# Patient Record
Sex: FEMALE | Race: WHITE
[De-identification: names, ages, dates, MRNs, and addresses within clinical notes are randomized per-mention and may not be internally consistent; named-entity substitution may affect disease eponyms.]

---

## 2017-02-28 ENCOUNTER — HOSPITAL ENCOUNTER (OUTPATIENT)
Dept: HOSPITAL 75 - RAD | Age: 52
End: 2017-02-28
Attending: OBSTETRICS & GYNECOLOGY
Payer: COMMERCIAL

## 2017-02-28 DIAGNOSIS — Z12.31: Primary | ICD-10-CM

## 2017-02-28 PROCEDURE — 77067 SCR MAMMO BI INCL CAD: CPT

## 2017-02-28 NOTE — XMS REPORT
Continuity of Care Document

 Created on: 2017



LUCIE AGUIAR

External Reference #: [id number]

: 1965

Sex: Female



Demographics







 Address  [address] [place]  [address]

 

 Home Phone  [phone]

 

 Preferred Language  Unknown

 

 Marital Status  Unknown

 

 Yazdanism Affiliation  Unknown

 

 Race  Unknown

 

 Ethnic Group  Unknown





Author







 Author  [facility]

 

 Organization  [facility]

 

 Address  Unknown

 

 Phone  Unavailable



                                                      



Allergies

                      





 Active                    Description                    Code                 
   Type                    Severity                    Reaction                
    Onset                    Reported/Identified                    
Relationship to Patient                    Clinical Status                

 

 Yes                    ENVIRONMENTAL                    ENVIRONMENTAL         
                                Mild                    N/A                    
                     2009                                                
          



                                                                               
         



Medications

                                                                               
         



Problems

                      





 Date Dx Coded                    Attending                    Type            
        Code                    Diagnosis                    Diagnosed By      
          

 

 2011                                         Ot                    
574.10                    CHOLELITH W CHOLECYS NEC                             
        

 

 2011                                         Ot                    575.6
                    GB CHOLESTEROLOSIS                                     

 

 2014                                         Ot                    621.2
                                                          

 

 2014                                         Ot                    625.8
                                                          

 

 2014                                         Ot                    787.3
                                                          

 

 2014                                         Ot                    620.2
                                                          

 

 2014                                         Ot                    621.2
                                                          

 

 2014                                         Ot                    620.2
                                                          

 

 2014                                         Ot                    793.5
                                                          

 

 2014                                         Ot                    621.0
                                                          

 

 2014                                         Ot                    626.8
                                                          

 

 2014                                         Ot                    793.5
                                                          

 

 2014                                         Ot                    
V72.83                                                          

 

 2014                                         Ot                    V74.8
                                                          

 

 2014                                         Ot                    
789.00                                                          

 

 2014                                         Ot                    
611.72                                                          

 

 2014                                         Ot                    
V76.12                                                          

 

 2014                                         Ot                    
V76.12                                                          

 

 2014                                         Ot                    575.6
                                                          

 

 2014                                         Ot                    
V72.63                                                          

 

 2014                                         Ot                    
V72.81                                                          

 

 2014                                         Ot                    V74.8
                                                          

 

 2014                                         Ot                    
793.82                                                          

 

 2014                                         Ot                    
V76.12                                                          

 

 2014                                         Ot                    
793.82                                                          

 

 2014                                         Ot                    
793.89                                                          

 

 2014                                         Ot                    
793.89                                                          

 

 2014                    ADRIANNA MUNOZ DO                    Ot         
           793.80                                                          

 

 2014                                         Ot                    621.2
                                                          

 

 2014                                         Ot                    625.8
                                                          

 

 2014                                         Ot                    787.3
                                                          

 

 2014                                         Ot                    620.2
                                                          

 

 2014                                         Ot                    621.2
                                                          

 

 2014                                         Ot                    620.2
                                                          

 

 2014                                         Ot                    793.5
                                                          

 

 2014                                         Ot                    621.0
                                                          

 

 2014                                         Ot                    626.8
                                                          

 

 2014                                         Ot                    793.5
                                                          

 

 2014                                         Ot                    
V72.83                                                          

 

 2014                                         Ot                    V74.8
                                                          

 

 2014                                         Ot                    
789.00                                                          

 

 2014                                         Ot                    
611.72                                                          

 

 2014                                         Ot                    
V76.12                                                          

 

 2014                                         Ot                    
V76.12                                                          

 

 2014                                         Ot                    575.6
                                                          

 

 2014                                         Ot                    
V72.63                                                          

 

 2014                                         Ot                    
V72.81                                                          

 

 2014                                         Ot                    V74.8
                                                          

 

 2014                                         Ot                    
793.82                                                          

 

 2014                                         Ot                    
V76.12                                                          

 

 2014                                         Ot                    
793.82                                                          

 

 2014                                         Ot                    
793.89                                                          

 

 2014                                         Ot                    
793.89                                                          

 

 2014                    ADRIANNA MUNOZ DO                    Ot         
           793.80                                                          

 

 12/10/2014                    ADRIANNA MUNOZ DO                    Ot         
           793.89                                                          

 

 2015                    ADRIANNA MUNOZ DO                    Ot         
           793.89                                                          

 

 2015                                         Ot                    
611.72                                                          

 

 2015                                         Ot                    
V76.12                                                          

 

 2015                                         Ot                    
V76.12                                                          

 

 2015                                         Ot                    575.6
                                                          

 

 2015                                         Ot                    
V72.63                                                          

 

 2015                                         Ot                    
V72.81                                                          

 

 2015                                         Ot                    V74.8
                                                          

 

 2015                                         Ot                    
793.82                                                          

 

 2015                                         Ot                    
V76.12                                                          

 

 2015                                         Ot                    
793.82                                                          

 

 2015                                         Ot                    
793.89                                                          

 

 2015                                         Ot                    
793.89                                                          

 

 2015                    ADRIANNA MUNOZ DO                    Ot         
           793.80                                                          

 

 2015                    ADRIANNA MUNOZ DO                    Ot         
           793.89                                                          

 

 11/10/2015                                         Ot                    
611.72                                                          

 

 11/10/2015                                         Ot                    
V76.12                                                          

 

 11/10/2015                                         Ot                    
V76.12                                                          

 

 11/10/2015                                         Ot                    575.6
                                                          

 

 11/10/2015                                         Ot                    
V72.63                                                          

 

 11/10/2015                                         Ot                    
V72.81                                                          

 

 11/10/2015                                         Ot                    V74.8
                                                          

 

 11/10/2015                                         Ot                    
793.82                                                          

 

 11/10/2015                                         Ot                    
V76.12                                                          

 

 11/10/2015                                         Ot                    
793.82                                                          

 

 11/10/2015                                         Ot                    
793.89                                                          

 

 11/10/2015                                         Ot                    
793.89                                                          

 

 11/10/2015                    ADRIANNA MUNOZ DO JUS                    Ot         
           793.80                                                          

 

 11/10/2015                    ALEXANDER DOADRIANNA JUS                    Ot         
           793.89                                                          

 

 2016                                         Ot                    
611.72                                                          

 

 2016                                         Ot                    
V76.12                                                          

 

 2016                                         Ot                    
V76.12                                                          

 

 2016                                         Ot                    575.6
                                                          

 

 2016                                         Ot                    
V72.63                                                          

 

 2016                                         Ot                    
V72.81                                                          

 

 2016                                         Ot                    V74.8
                                                          

 

 2016                                         Ot                    
793.82                                                          

 

 2016                                         Ot                    
V76.12                                                          

 

 2016                                         Ot                    
793.82                                                          

 

 2016                                         Ot                    
793.89                                                          

 

 2016                                         Ot                    
793.89                                                          

 

 2016                    ALEXANDER LEE ADRIANNA C                    Ot         
           793.80                                                          

 

 2016                    ALEXANDER LEE ADRIANNA C                    Ot         
           793.89                                                          

 

 2016                    ALXEANDER LEE ADRIANNA C                    Ot         
           Z12.31                                                          

 

 02/10/2016                    ALEXANDER DO ADRIANNA C                    Ot         
           Z12.31                                                          

 

 2016                                         Ot                    
V76.12                                                          

 

 2016                                         Ot                    575.6
                                                          

 

 2016                                         Ot                    
V72.63                                                          

 

 2016                                         Ot                    
V72.81                                                          

 

 2016                                         Ot                    V74.8
                                                          

 

 2016                                         Ot                    
793.82                                                          

 

 2016                                         Ot                    
V76.12                                                          

 

 2016                                         Ot                    
793.82                                                          

 

 2016                                         Ot                    
793.89                                                          

 

 2016                                         Ot                    
793.89                                                          

 

 2016                    ALEXANDER LEE ADRIANNA C                    Ot         
           793.80                                                          

 

 2016                    ALEXANDER LEE ADRIANNA C                    Ot         
           793.89                                                          

 

 2016                    ALEXANDER DO ADRIANNA C                    Ot         
           Z12.31                                                          

 

 2016                                         Ot                    
V76.12                                                          

 

 2016                                         Ot                    575.6
                                                          

 

 2016                                         Ot                    
V72.63                                                          

 

 2016                                         Ot                    
V72.81                                                          

 

 2016                                         Ot                    V74.8
                                                          

 

 2016                                         Ot                    
793.82                                                          

 

 2016                                         Ot                    
V76.12                                                          

 

 2016                                         Ot                    
793.82                                                          

 

 2016                                         Ot                    
793.89                                                          

 

 2016                                         Ot                    
793.89                                                          

 

 2016                    MUNOZ DO ADRIANNA C                    Ot         
           793.80                                                          

 

 2016                    MUNOZ DO ADRIANNA LUU                    Ot         
           793.89                                                          

 

 2016                                         Ot                    
V76.12                    OTH SCREEN MAMMO-MALIGN NEOPLASM OF YOUSIF             
                        

 

 2016                                         Ot                    575.6
                    GB CHOLESTEROLOSIS                                     

 

 2016                                         Ot                    
V72.63                    PRE-PROCEDURAL LABORATORY EXAMINATION                
                     

 

 2016                                         Ot                    
V72.81                    EXAM-PRE-OPERATIVE CARDIOVASCULAR                    
                 

 

 2016                                         Ot                    V74.8
                    SCREEN-BACTERIAL DIS NEC                                   
  

 

 2016                                         Ot                    
793.82                    INCONCLUSIVE MAMMOGRAM                               
      

 

 2016                                         Ot                    
V76.12                    OTH SCREEN MAMMO-MALIGN NEOPLASM OF YOUSIF             
                        

 

 2016                                         Ot                    
793.82                    INCONCLUSIVE MAMMOGRAM                               
      

 

 2016                                         Ot                    
793.89                    OTH (ABN) FINDINGS ON RADIOLOGICAL EXAMI             
                        

 

 2016                                         Ot                    
793.89                    OTH (ABN) FINDINGS ON RADIOLOGICAL EXAMI             
                        

 

 2016                    ADRIANNA MUNOZ DO                    Ot         
           793.80                    UNSPEC ABNORMAL MAMMOGRAM                 
                    

 

 2016                    ADRIANNA MUNOZ DO                    Ot         
           793.89                    OTH (ABN) FINDINGS ON RADIOLOGICAL EXAMI  
                                   

 

 2016                                         Ot                    
V76.12                    OTH SCREEN MAMMO-MALIGN NEOPLASM OF YOUSIF             
                        

 

 2016                                         Ot                    575.6
                    GB CHOLESTEROLOSIS                                     

 

 2016                                         Ot                    
V72.63                    PRE-PROCEDURAL LABORATORY EXAMINATION                
                     

 

 2016                                         Ot                    
V72.81                    EXAM-PRE-OPERATIVE CARDIOVASCULAR                    
                 

 

 2016                                         Ot                    V74.8
                    SCREEN-BACTERIAL DIS NEC                                   
  

 

 2016                                         Ot                    
793.82                    INCONCLUSIVE MAMMOGRAM                               
      

 

 2016                                         Ot                    
V76.12                    OTH SCREEN MAMMO-MALIGN NEOPLASM OF YOUSIF             
                        

 

 2016                                         Ot                    
793.82                    INCONCLUSIVE MAMMOGRAM                               
      

 

 2016                                         Ot                    
793.89                    OTH (ABN) FINDINGS ON RADIOLOGICAL EXAMI             
                        

 

 2016                                         Ot                    
793.89                    OTH (ABN) FINDINGS ON RADIOLOGICAL EXAMI             
                        

 

 2016                    ADRIANNA MUNOZ DO                    Ot         
           793.80                    UNSPEC ABNORMAL MAMMOGRAM                 
                    

 

 2016                    ADRIANNA MUNOZ DO                    Ot         
           793.89                    OTH (ABN) FINDINGS ON RADIOLOGICAL EXAMI  
                                   

 

 2016                                         Ot                    
V76.12                    OTH SCREEN MAMMO-MALIGN NEOPLASM OF YOUSIF             
                        

 

 2016                                         Ot                    575.6
                    GB CHOLESTEROLOSIS                                     

 

 2016                                         Ot                    
V72.63                    PRE-PROCEDURAL LABORATORY EXAMINATION                
                     

 

 2016                                         Ot                    
V72.81                    EXAM-PRE-OPERATIVE CARDIOVASCULAR                    
                 

 

 2016                                         Ot                    V74.8
                    SCREEN-BACTERIAL DIS NEC                                   
  

 

 2016                                         Ot                    
793.82                    INCONCLUSIVE MAMMOGRAM                               
      

 

 2016                                         Ot                    
V76.12                    OTH SCREEN MAMMO-MALIGN NEOPLASM OF YOUSIF             
                        

 

 2016                                         Ot                    
793.82                    INCONCLUSIVE MAMMOGRAM                               
      

 

 2016                                         Ot                    
793.89                    OTH (ABN) FINDINGS ON RADIOLOGICAL EXAMI             
                        

 

 2016                                         Ot                    
793.89                    OTH (ABN) FINDINGS ON RADIOLOGICAL EXAMI             
                        

 

 2016                    ADRIANNA MUNOZ DO                    Ot         
           793.80                    UNSPEC ABNORMAL MAMMOGRAM                 
                    

 

 2016                    ADRIANNA MUNOZ DO                    Ot         
           793.89                    OTH (ABN) FINDINGS ON RADIOLOGICAL EXAMI  
                                   

 

 2016                                         Ot                    
V76.12                    OTH SCREEN MAMMO-MALIGN NEOPLASM OF YOUSIF             
                        

 

 2016                                         Ot                    575.6
                    GB CHOLESTEROLOSIS                                     

 

 2016                                         Ot                    
V72.63                    PRE-PROCEDURAL LABORATORY EXAMINATION                
                     

 

 2016                                         Ot                    
V72.81                    EXAM-PRE-OPERATIVE CARDIOVASCULAR                    
                 

 

 2016                                         Ot                    V74.8
                    SCREEN-BACTERIAL DIS NEC                                   
  

 

 2016                                         Ot                    
793.82                    INCONCLUSIVE MAMMOGRAM                               
      

 

 2016                                         Ot                    
V76.12                    OTH SCREEN MAMMO-MALIGN NEOPLASM OF YOUSIF             
                        

 

 2016                                         Ot                    
793.82                    INCONCLUSIVE MAMMOGRAM                               
      

 

 2016                                         Ot                    
793.89                    OTH (ABN) FINDINGS ON RADIOLOGICAL EXAMI             
                        

 

 2016                                         Ot                    
793.89                    OTH (ABN) FINDINGS ON RADIOLOGICAL EXAMI             
                        

 

 2016                    ADRIANNA MUNOZ DO                    Ot         
           793.80                    UNSPEC ABNORMAL MAMMOGRAM                 
                    

 

 2016                    ADRIANNA MUNOZ DO                    Ot         
           793.89                    OTH (ABN) FINDINGS ON RADIOLOGICAL EXAMI  
                                   

 

 06/15/2016                                         Ot                    
V76.12                    OTH SCREEN MAMMO-MALIGN NEOPLASM OF YOUSIF             
                        

 

 06/15/2016                                         Ot                    575.6
                    GB CHOLESTEROLOSIS                                     

 

 06/15/2016                                         Ot                    
V72.63                    PRE-PROCEDURAL LABORATORY EXAMINATION                
                     

 

 06/15/2016                                         Ot                    
V72.81                    EXAM-PRE-OPERATIVE CARDIOVASCULAR                    
                 

 

 06/15/2016                                         Ot                    V74.8
                    SCREEN-BACTERIAL DIS NEC                                   
  

 

 06/15/2016                                         Ot                    
793.82                    INCONCLUSIVE MAMMOGRAM                               
      

 

 06/15/2016                                         Ot                    
V76.12                    OTH SCREEN MAMMO-MALIGN NEOPLASM OF YOUSIF             
                        

 

 06/15/2016                                         Ot                    
793.82                    INCONCLUSIVE MAMMOGRAM                               
      

 

 06/15/2016                                         Ot                    
793.89                    OTH (ABN) FINDINGS ON RADIOLOGICAL EXAMI             
                        

 

 06/15/2016                                         Ot                    
793.89                    OTH (ABN) FINDINGS ON RADIOLOGICAL EXAMI             
                        

 

 06/15/2016                    ADRIANNA MUNOZ DO                    Ot         
           793.80                    UNSPEC ABNORMAL MAMMOGRAM                 
                    

 

 06/15/2016                    ADRIANNA MUNOZ DO                    Ot         
           793.89                    OTH (ABN) FINDINGS ON RADIOLOGICAL EXAMI  
                                   



                                                                               
                                                                               
                                                                               
                                                                               
                                                                               
                                                                               
                                                                               
                                                                               
                                                                               
                                                                               
                                                                               
                                                                               
                                                                               
                                                                               
                                                                               
                                                                               
                                                                               
                                                                               
                                                                               
                                                                               
                                                                               
                                                                               
                                                                               
                                                                               
                                



Procedures

                                                                               
         



Results

                                                                    



Encounters

                      





 ACCT No.                    Visit Date/Time                    Discharge      
              Status                    Pt. Type                    Provider   
                 Facility                    Loc./Unit                    
Complaint                

 

 [id number]                    2014 07:39:00                    2014 23:59:59                    [facility]                    Outpatient             
       ADRIANNA MUNOZ DO JUS                    [facility]   
                 RAD                                     

 

 [id number]                    10/29/2013 07:32:00                    10/29/
2013 23:59:59                    [facility]S                    Outpatient             
       ADRIANNA MUNOZ DO JUS                    [facility]   
                 RAD                                     

 

 [id number]                    2016 08:18:00                           
              ACT                    Outpatient                    ADRIANNA MUNOZ DO JUS                    [facility]                    
RAD                                     

 

 [id number]                    2015 12:04:00                           
                                   Document Registration                       
                                                                             

 

 [id number]                    2013 12:06:00                           
                                   Document Registration                       
                                                                             

 

 [id number]                    10/15/2012 07:35:00                           
                                   Document Registration                       
                                                                             

 

 [id number]                    10/10/2012 07:08:00                           
                                   Document Registration                       
                                                                             

 

 [id number]                    2011 05:35:00                           
                                   Document Registration                       
                                                                             

 

 [id number]                    2011 14:04:00                           
                                   Document Registration                       
                                                                             

 

 [id number]                    2011 08:27:00                           
                                   Document Registration                       
                                                                             

 

 [id number]                    2010 09:30:00                           
                                   Document Registration                       
                                                                             

 

 [id number]                    2010 08:14:00                           
                                   Document Registration                       
                                                                             

 

 [id number]                    10/23/2009 13:00:00                           
                                   Document Registration                       
                                                                             

 

 [id number]                    2009 11:06:00                           
                                   Document Registration                       
                                                                             

 

 [id number]                    2009 10:05:00                           
                                   Document Registration                       
                                                                             

 

 [id number]                    2009 10:01:00                           
                                   Document Registration

## 2017-03-01 NOTE — DIAGNOSTIC IMAGING REPORT
Bilateral screening mammogram.



The current study was also evaluated with a Computer Aided

Detection (CAD) system.



INDICATION: Screening. No current complaints stated on the

questionnaire.



COMPARISON: 01/12/2016.



FINDINGS: The breasts are composed of heterogeneously dense

parenchyma which may decrease mammographic sensitivity. No

developing mass, architectural distortion or suspicious cluster

of calcification. Allowing for technique and positional

differences, no suspicious change is seen.



IMPRESSION: Dense breasts with no definite change.



ACR BI-RADS Category 2: Benign findings.

Result letter will be mailed to the patient.

Note: At least 10% of breast cancer is not imaged by mammography.



Dictated by:



Dictated on workstation # [id number]

## 2017-11-22 ENCOUNTER — HOSPITAL ENCOUNTER (OUTPATIENT)
Dept: HOSPITAL 75 - CARD | Age: 52
End: 2017-11-22
Attending: INTERNAL MEDICINE
Payer: COMMERCIAL

## 2017-11-22 VITALS — HEIGHT: 67 IN | WEIGHT: 160 LBS | BODY MASS INDEX: 25.11 KG/M2

## 2017-11-22 VITALS — SYSTOLIC BLOOD PRESSURE: 129 MMHG | DIASTOLIC BLOOD PRESSURE: 96 MMHG

## 2017-11-22 DIAGNOSIS — R07.89: Primary | ICD-10-CM

## 2017-11-22 PROCEDURE — 93017 CV STRESS TEST TRACING ONLY: CPT

## 2017-11-22 PROCEDURE — 78452 HT MUSCLE IMAGE SPECT MULT: CPT

## 2017-11-22 NOTE — STRESS TEST
DATE OF SERVICE:  11/22/2017



NUCLEAR MYOVIEW REPORT



REFERRING PHYSICIAN:

Shey Salcido DO



SUMMARY:

The patient was injected with 10.25 mCi of technetium-99 Myoview and the resting

images were obtained.  With peak stress level, a 30.1 mCi of technetium-99

Myoview were injected and the stress images were acquired.



The resting and stress images were reviewed and compared in the short axis,

horizontal long axis and vertical long axis views.  Review of the images showed

motion artifact with breast attenuation, mild decreased uptake at the mid to

apical inferior wall and inferior lateral wall.  SSS is 6.  SDS is 6.  TID value

is 0.74.  On the gated images, the left ventricle appeared to be normal size

with normal contractility.  Calculated ejection fraction 76%.



CONCLUSION:

1.  Breast attenuation with motion artifact.

2.  Mild ischemia at the mid to apical inferior wall and inferoseptum.

3.  Normal left ventricular size with normal contractility.  Calculated ejection

fraction 76%.





Job ID: [id number]

DocumentID: [id number]

Dictated Date:  11/22/2017 14:20:23

Transcription Date: 11/22/2017 17:25:05

Dictated By: CHRISTIAN NAVARRO MD

## 2017-11-29 ENCOUNTER — HOSPITAL ENCOUNTER (OUTPATIENT)
Dept: HOSPITAL 75 - CATH | Age: 52
Discharge: HOME | End: 2017-11-29
Attending: INTERNAL MEDICINE
Payer: COMMERCIAL

## 2017-11-29 VITALS — DIASTOLIC BLOOD PRESSURE: 49 MMHG | SYSTOLIC BLOOD PRESSURE: 141 MMHG

## 2017-11-29 VITALS — SYSTOLIC BLOOD PRESSURE: 146 MMHG | DIASTOLIC BLOOD PRESSURE: 78 MMHG

## 2017-11-29 VITALS — DIASTOLIC BLOOD PRESSURE: 83 MMHG | SYSTOLIC BLOOD PRESSURE: 146 MMHG

## 2017-11-29 VITALS — DIASTOLIC BLOOD PRESSURE: 78 MMHG | SYSTOLIC BLOOD PRESSURE: 148 MMHG

## 2017-11-29 VITALS — DIASTOLIC BLOOD PRESSURE: 80 MMHG | SYSTOLIC BLOOD PRESSURE: 158 MMHG

## 2017-11-29 VITALS — SYSTOLIC BLOOD PRESSURE: 148 MMHG | DIASTOLIC BLOOD PRESSURE: 81 MMHG

## 2017-11-29 VITALS — BODY MASS INDEX: 25.11 KG/M2 | WEIGHT: 160 LBS | HEIGHT: 67 IN

## 2017-11-29 VITALS — DIASTOLIC BLOOD PRESSURE: 76 MMHG | SYSTOLIC BLOOD PRESSURE: 149 MMHG

## 2017-11-29 VITALS — SYSTOLIC BLOOD PRESSURE: 144 MMHG | DIASTOLIC BLOOD PRESSURE: 92 MMHG

## 2017-11-29 DIAGNOSIS — Z11.2: ICD-10-CM

## 2017-11-29 DIAGNOSIS — I25.10: Primary | ICD-10-CM

## 2017-11-29 LAB
ALBUMIN SERPL-MCNC: 4.1 GM/DL (ref 3.2–4.5)
ALT SERPL-CCNC: 30 U/L (ref 0–55)
ANION GAP SERPL CALC-SCNC: 8 MMOL/L (ref 5–14)
APTT BLD: 26 SEC (ref 24–35)
AST SERPL-CCNC: 23 U/L (ref 5–34)
BILIRUB SERPL-MCNC: 0.4 MG/DL (ref 0.1–1)
BILIRUB UR QL STRIP: NEGATIVE
BUN SERPL-MCNC: 18 MG/DL (ref 7–18)
BUN/CREAT SERPL: 25
CALCIUM SERPL-MCNC: 9.7 MG/DL (ref 8.5–10.1)
CHLORIDE SERPL-SCNC: 106 MMOL/L (ref 98–107)
CO2 SERPL-SCNC: 28 MMOL/L (ref 21–32)
CREAT SERPL-MCNC: 0.72 MG/DL (ref 0.6–1.3)
ERYTHROCYTE [DISTWIDTH] IN BLOOD BY AUTOMATED COUNT: 12.3 % (ref 10–14.5)
GFR SERPLBLD BASED ON 1.73 SQ M-ARVRAT: > 60 ML/MIN
GLUCOSE SERPL-MCNC: 86 MG/DL (ref 70–105)
INR PPP: 0.9 (ref 0.8–1.4)
KETONES UR QL STRIP: NEGATIVE
LEUKOCYTE ESTERASE UR QL STRIP: (no result)
MCH RBC QN AUTO: 30 PG (ref 25–34)
MCHC RBC AUTO-ENTMCNC: 35 G/DL (ref 32–36)
MCV RBC AUTO: 85 FL (ref 80–99)
NITRITE UR QL STRIP: NEGATIVE
PH UR STRIP: 7 [PH] (ref 5–9)
PLATELET # BLD: 262 10^3/UL (ref 130–400)
PMV BLD AUTO: 9.2 FL (ref 7.4–10.4)
POTASSIUM SERPL-SCNC: 3.6 MMOL/L (ref 3.6–5)
PROT SERPL-MCNC: 7.6 GM/DL (ref 6.4–8.2)
PROT UR QL STRIP: (no result)
PROTHROMBIN TIME: 12.2 SEC (ref 12.2–14.7)
RBC # BLD AUTO: 4.64 10^6/UL (ref 4.35–5.85)
SODIUM SERPL-SCNC: 142 MMOL/L (ref 135–145)
SP GR UR STRIP: 1.01 (ref 1.02–1.02)
SQUAMOUS #/AREA URNS HPF: >50 /HPF
UROBILINOGEN UR-MCNC: NORMAL MG/DL
WBC # BLD AUTO: 6.8 10^3/UL (ref 4.3–11)
WBC #/AREA URNS HPF: (no result) /HPF

## 2017-11-29 PROCEDURE — 93458 L HRT ARTERY/VENTRICLE ANGIO: CPT

## 2017-11-29 PROCEDURE — 85730 THROMBOPLASTIN TIME PARTIAL: CPT

## 2017-11-29 PROCEDURE — 85027 COMPLETE CBC AUTOMATED: CPT

## 2017-11-29 PROCEDURE — 93005 ELECTROCARDIOGRAM TRACING: CPT

## 2017-11-29 PROCEDURE — 87081 CULTURE SCREEN ONLY: CPT

## 2017-11-29 PROCEDURE — 71010: CPT

## 2017-11-29 PROCEDURE — 85610 PROTHROMBIN TIME: CPT

## 2017-11-29 PROCEDURE — 36415 COLL VENOUS BLD VENIPUNCTURE: CPT

## 2017-11-29 PROCEDURE — 80053 COMPREHEN METABOLIC PANEL: CPT

## 2017-11-29 PROCEDURE — 81000 URINALYSIS NONAUTO W/SCOPE: CPT

## 2017-11-29 NOTE — XMS REPORT
[place] [facility] Clinical Summary

 Created on: 2015



César Nay KATTY

External Reference #: [id number]

: 1965

Sex: Female



Demographics







 Address  [address] [place]  [address]

 

 Home Phone  [email or web address]

 

 Preferred Language  Unknown

 

 Marital Status  M

 

 Advent Affiliation  Unknown

 

 Race  White

 

 Ethnic Group  Not  or 





Author







 Author  User, NAE Puckett  [place] [facility]

 

 Address  Unknown

 

 Phone  [phone]







Allergies, Adverse Reactions, Alerts







 Allergy Name  Reaction Description  Start Date  Severity  Status  Provider

 

 No Known Allergies             Shey Salcido







Conditions or Problems







 Problem Name  Problem Code  Onset Date  Status  Entry Date  Provider  Comment  
Standard Description  Annotate

 

 RENAL CALCULUS  592.0    Active    Shey Salcido   
  Calculus of kidney   

 

 WEIGHT GAIN, ABNORMAL  783.1    Resolved    Shey Salcido     Abnormal weight gain   

 

 BACK PAIN  724.5    Resolved    Shey Salcido     
Backache, unspecified   

 

 MUSCLE PAIN  729.1    Resolved    Shey Salcido     
Myalgia and myositis, unspecified   

 

 AMENORRHEA  626.0    Resolved    Shey Salcido     
Absence of menstruation   

 

 NEUROPATHY, IDIOPATHIC PERIPHERAL  356.9    Resolved    
Shey Salcido     Unspecified idiopathic peripheral neuropathy   

 

 DIARRHEA, RECURRENT  787.91    Resolved    Shey Salcido     Diarrhea   

 

 MENOPAUSAL SYNDROME  627.0    Active    Shey Salcido     Premenopausal menorrhagia   

 

 MALABSORPTION SYNDROME  579.9    Resolved    Shey Salcido     Unspecified intestinal malabsorption   

 

 HYPOKALEMIA  276.8    Active    Shey Salcido     
Hypopotassemia   

 

 HYPOMAGNESEMIA  275.2    Active    Shey Salcido   
  Disorders of magnesium metabolism   

 

 PALPITATIONS, OCCASIONAL  785.1  2009/07/10  Resolved    Shey Salcido     Palpitations   

 

 ABDOMINAL PAIN, EPIGASTRIC  789.06    Resolved    Shey Salcido     Abdominal pain, epigastric   

 

 HYPERTENSION  401.1    Active    Shey Salcido     
Benign essential hypertension   

 

 PSORIASIS  696.1    Resolved    Shey Salcido     
Other psoriasis   

 

 ONYCHOMYCOSIS, TOENAILS  110.1    Resolved    Shey Salcido     Dermatophytosis of nail   

 

 MIGRAINE VARIANT  346.20    Active    Shey Salcido
     Variants of migraine, not elsewhere classified, without mention of 
intractable migraine, without mention of status migrainosus   

 

 ABNORMAL MAMMOGRAM  793.80  2012/10/15  Resolved    Shey Salcido     Abnormal mammogram, unspecified   

 

 BRONCHITIS  490    Resolved    Shey Salcido     
Bronchitis, not specified as acute or chronic   

 

 WHEEZING  786.07    Resolved    Shey Salcido     
Wheezing   

 

 SINUSITIS, SPHENOIDAL, ACUTE  461.3    Resolved    Shey Salcido     Acute sphenoidal sinusitis   

 

 PHARYNGITIS, ACUTE  462    Resolved    Shey Salcido     Acute pharyngitis   

 

 CHEST PAIN, ATYPICAL  786.59    Active    Shey Salcido     Other chest pain   

 

 COUGH  786.2    Active    Shey Salcido     Cough   

 

 ALLERGIC RHINITIS, SEASONAL  477.0    Active    Shey Salcido     Allergic rhinitis due to pollen   

 

 DYSPNEA  786.09    Active    Shey Salcido     
Other dyspnea and respiratory abnormality   







Medication List







 Medication  Instructions  Start Date  Stop Date  Generic Name  NDC  Status  
Provider  Patient Instruction

 

 MINOCIN 100 MG CAPS  1 PO BID    MINOCYCLINE HCL  
[id number]  Active  Shey Salcido   

 

 DIFLUCAN 100 MG TAB  1 PO daily for three days for yeast infection.    FLUCONAZOLE  [id number]  No Longer Active  Shey Salcido   

 

 PROAIR  (90 BASE) MCG/ACT AERS  2 puff Q4 hrs prn wheezing   
    ALBUTEROL SULFATE  [id number]  Active  Shey Salcido   

 

 PREDNISONE 10 MG TAB  2 PO at one time once daily for two days, then 1 PO 
daily for two days.    PREDNISONE  [id number]  No Longer 
Active  Shey Salcido   

 

 SUDAFED 30 MG TAB  1 PO TID prn congestion    
PSEUDOEPHEDRINE HCL  [id number]  No Longer Active  Shey Salcido   

 

 POTASSIUM CHLORIDE ER 20 MEQ CR-TABS  1 PO DAILY       POTASSIUM 
CHLORIDE  [id number]  Active  Jennifer Hatfield   

 

 LAMISIL 250 MG TAB  1 po daily    TERBINAFINE HCL  
[id number]  No Longer Active  Shey Salcido   

 

 IMITREX 50 MG TABS  1 po at first sign of migrane    
SUMATRIPTAN SUCCINATE  [id number]  No Longer Active  Shey Salcido   

 

 NAFTIN 2 % CREA  apply small amount to affected areas bid    NAFTIFINE HCL  48674609980  No Longer Active  Shey Salcido   

 

 TRIAMCINOLONE ACETONIDE 0.5 % CREA  apply a small amount to affected area bid 
for 2 weeks    TRIAMCINOLONE ACETONIDE  60913397202  No 
Longer Active  Shey Salcido   

 

 NORVASC 2.5 MG TAB  1 PO QD       AMLODIPINE BESYLATE  [id number]  
Active  Jennifer Hatfield   

 

 CEFTIN 500 MG TABS  1 PBID for 30 days    CEFUROXIME 
AXETIL  [id number]  No Longer Active  Ashley MASON'S NASAL SPRAY (DEXAMETHASONE, GENTAMICIN, SALINE)  2 puffs each 
nostril TID for 10 days    DR. CLARK NASAL SPRAY (
DEXAMETHASONE, GENTAMICIN, SALINE)     No Longer Active  Ashley Perez   

 

 DIFLUCAN 100 MG TAB  1 PO daily as directed    
FLUCONAZOLE  [id number]  No Longer Active  Ashley Perez   

 

 MAGIC MOUTHWASH  1 tsp PO QID    MAGIC MOUTHWASH     No 
Longer Active  Shey Natividad MASON'S NASAL SPRAY (DEXAMETHASONE, GENTAMICIN, SALINE)  2 puffs each 
nostril TID for 10 days    2013/02/15  DR. CLARK NASAL SPRAY (
DEXAMETHASONE, GENTAMICIN, SALINE)     No Longer Active  Shey Natividad Salcido   

 

 HYDROCODONE-HOMATROPINE 5-1.5 MG/5ML SYRP  1 teaspoon PO Q6hrs prn    HYDROCODONE-HOMATROPINE  80889804581  No Longer Active  Shey 
Natividad Salcido   

 

 TESSALON 200 MG CAPS  1 PO TID prn cough    BENZONATATE  
[id number]  No Longer Active  Shey Natividad Salcido   

 

 LEVAQUIN 500 MG TAB  1 PO QD    LEVOFLOXACIN  
[id number]  No Longer Active  Shey Natividad Salcido   

 

 FIORINAL 325-50-40 MG CAP  1 PO Q4hrs prn headache       ASPIRIN-
CAFFEINE-BUTALBITAL  [id number]  Active  Shey Natividad Salcido   

 

 LAMISIL 250 MG TAB  1 PO daily    TERBINAFINE HCL  
[id number]  No Longer Active  Shey Natividad Salcido   

 

 NEEVO 29-1-0.4 MG TABS  1 PO daily       PRENAT VIT-FEPOLY-METHYLFOL-
FA  [id number]  No Longer Active  Shey Natividad Salcido   

 

 LUPRON DEPOT 11.25 MG KIT  1 injection q 3months       LEUPROLIDE 
ACETATE (3 MONTH)  [id number]  No Longer Active  Shey Natividad Salcido   

 

 XANAX 0.25 MG TABS  1/2 to 1 PO Q6hrs prn  2009/07/10    ALPRAZOLAM  
[id number]  No Longer Active  Shey Natividad Salcido   

 

 LORTAB 5 5-500 MG TABS  1 to 2 PO Q6hrs prn    
ACETAMINOPHEN-HYDROCODONE  [id number]  No Longer Active  Sheyjerod Salcido 
  

 

 PRENATAL VITAMINS  TABS       PRENATAL MV & MIN W/FE-FA 
TABS  [id number]  No Longer Active  Shey Natividad Salcido   

 

 LAMISIL 250 MG TABS  1 po daily       TERBINAFINE HCL  [id number]  
No Longer Active  Shey Natividad Salcido   

 

 MAGNESIUM OXIDE 400 MG CAPS  1 PO daily    MAGNESIUM 
OXIDE  [id number]  No Longer Active  Shey Natividad Salcido   

 

 K-DUR 20 MEQ TAB CR  1 PO daily    POTASSIUM CHLORIDE   
  No Longer Active  Shey Natividad Omari   

 

 DYAZIDE 37.5-25 MG CAP  1 PO daily       TRIAMTERENE-HCTZ  
[id number]  Active  Jennifer Hatfield   

 

 MAGNESIUM OXIDE 400 MG CAPS  1 po daily  2009/07/10     MAGNESIUM OXIDE  
[id number]  Active  Jennifer Hatfield   

 

 DYAZIDE 37.5-25 MG CAP  1 PO daily     2009/07/10  TRIAMTERENE-HCTZ  
[id number]  No Longer Active  Shey Natividad Salcido   

 

 FEXOFENADINE  MG TABS  1 PO daily        FEXOFENADINE HCL  [id number]  
Active  Shey Natividad Salcido   







Vital Signs







 Date  Name  Value  Unit  Range  Description

 

   blood pressure, diastolic - 8462-4  86  mm[Hg]     BP aceves

 

   blood pressure, systolic - 8480-6  134  mm[Hg]     BP sys

 

   pulse rate E&M - 8867-4  68  /min     Heart rate

 

   respiratory rate E&M - 9279-1  14  /min     Resp rate

 

   temperature E&M  98.9  [degF]     Body temperature

 

   weight E&M - 3141-9  168  [lb_av]     Weight Measured

 

   blood pressure, diastolic - 8462-4  70  mm[Hg]     BP aceves

 

   blood pressure, systolic - 8480-6  140  mm[Hg]     BP sys

 

   pulse rate E&M - 8867-4  84  /min     Heart rate

 

   respiratory rate E&M - 9279-1  14  /min     Resp rate

 

   temperature E&M  98.6  [degF]     Body temperature

 

   weight E&M - 3141-9  170  [lb_av]     Weight Measured







Diagnostic Results







 Date  Name  Value  Unit  Range  Description

 

 Clinical Lists Update: CBC,CMP,FLP,TSH - Chemistry 

 

   Estimated Glomerular Filtration Rate (calc)  96  mL/min/1.73m2    
  

 

   glucose, plasma fasting  99  mg/dL      

 

   albumin, serum  4.3  g/dL      

 

   alkaline phosphatase, serum  58  U/L      

 

   urea nitrogen, blood  15  mg/dL      

 

   calcium, serum  9.5  mg/dL      

 

   chloride, serum  105  mmol/L      

 

   cholesterol, serum  156  mg/dL      

 

   cholesterol/HDL ratio, serum, percent  3.3         

 

   anion gap, serum  13         

 

   sodium, serum  140  mmol/L      

 

   triglyceride, serum, fasting  76  mg/dL      

 

   bilirubin, serum, total  0.3  mg/dL      

 

   alanine aminotransferase (SGPT), serum  9  U/L      

 

   aspartate aminotransferase (SGOT), serum  14  U/L      

 

   protein, total, serum  6.9  g/dL      

 

   potassium, serum  4.0  mmol/L      

 

   LDL cholesterol, serum  93  mg/dL      

 

   thyroid stimulating hormone, serum  1.55  u[iU]/mL      

 

   HDL cholesterol, serum  48.0  mg/dL      

 

   creatinine, serum  0.7  mg/dL      

 

   carbon dioxide, venous blood  26.0  mmol/L      

 

 Clinical Lists Update: CBC,CMP,FLP,TSH - Hematology 

 

   erythrocyte (RBC) count  4.76  10*6/mm3      

 

   leukocyte count, blood  5.9  10*3/mm3      

 

   mean corpuscular volume, RBC  89  fL      

 

   red blood cell distribution width  13.2  %      

 

   hemoglobin, blood  13.8  g/dL      

 

   platelet count  251  10*3/mm3      

 

   hematocrit, blood  42  %      







Encounters







 Code  Encounter  Date  Provider  Facility

 

 CPT-73587  Ofc Vst, Est Level III   15:19:53 CDT  Shey Natividad Angelaner  [facility]

 

 CPT-64512  Ofc Vst, Est Level IV   17:17:47 CDT  Shey Natividad 
Omari Salcido, DO, FACP

 

 CPT-98821  Ofc Vst, Est Level IV   12:58:28 CST  Shey Natividad 
Omari Salcido, DO, FACP

 

 CPT-95369  Ofc Vst, Est Level III   15:21:05 CDT  Shey Natividad 
Omari Salcido, DO, FACP

 

 CPT-98821  Ofc Vst, Est Level III   19:52:46 CST  Shey Natividad 
Omari Salcido DO, FACP

 

 CPT-26384  Ofc Vst, Est Level III   14:54:18 CST  [facility]

 

 CPT-22456  Ofc Vst, Est Level IV   16:08:18 CDT  [facility]

 

 CPT-28845  Ofc Vst, Est Level III   10:37:17 CDT  Shey Natividad 
Omari Salcido, DO, FACP

 

 CPT-05425  Ofc Vst, Est Level IV   15:57:52 CDT  Shey Natividad 
Omari Salcido, DO, FACP

 

 CPT-89157  Ofc Vst, Est Level IV   16:27:32 CDT  Shey Natividad 
Salcido  [facility]

 

 CPT-01035  Ofc Vst, Est Level IV  2009/07/10 10:30:06 CDT  Shey Natividad 
Omari Salcido, DO, FACP

 

 CPT-45551  Ofc Vst, New Level IV   15:56:53 CDT  [facility] 
Salcido  [facility]

## 2017-11-29 NOTE — XMS REPORT
[place] [facility] Clinical Summary

 Created on: 2015



César Nay KATTY

External Reference #: [id number]

: 1965

Sex: Female



Demographics







 Address  [address], [place]  [address]

 

 Home Phone  [phone]

 

 Preferred Language  Unknown

 

 Marital Status  M

 

 Hindu Affiliation  Unknown

 

 Race  White

 

 Ethnic Group  Not  or 





Author







 Author  User, NAE Puckett  [place] [facility]

 

 Address  Unknown

 

 Phone  [phone]







Allergies, Adverse Reactions, Alerts







 Allergy Name  Reaction Description  Start Date  Severity  Status  Provider

 

 No Known Allergies             Shey Salcido







Conditions or Problems







 Problem Name  Problem Code  Onset Date  Status  Entry Date  Provider  Comment  
Standard Description  Annotate

 

 RENAL CALCULUS  592.0    Active    Shey Salcido   
  Calculus of kidney   

 

 WEIGHT GAIN, ABNORMAL  783.1    Resolved    Shey Salcido     Abnormal weight gain   

 

 BACK PAIN  724.5    Resolved    Shey Salcido     
Backache, unspecified   

 

 MUSCLE PAIN  729.1    Resolved    Shey Salcido     
Myalgia and myositis, unspecified   

 

 AMENORRHEA  626.0    Resolved    Shey Salcido     
Absence of menstruation   

 

 NEUROPATHY, IDIOPATHIC PERIPHERAL  356.9    Resolved    
Shey Salcido     Unspecified idiopathic peripheral neuropathy   

 

 DIARRHEA, RECURRENT  787.91    Resolved    Shey Salcido     Diarrhea   

 

 MENOPAUSAL SYNDROME  627.0    Active    Shey Salcido     Premenopausal menorrhagia   

 

 MALABSORPTION SYNDROME  579.9    Resolved    Shey Salcido     Unspecified intestinal malabsorption   

 

 HYPOKALEMIA  276.8    Active    Shey Salcido     
Hypopotassemia   

 

 HYPOMAGNESEMIA  275.2    Active    Shey Salcido   
  Disorders of magnesium metabolism   

 

 PALPITATIONS, OCCASIONAL  785.1  2009/07/10  Resolved    Shey Salcido     Palpitations   

 

 ABDOMINAL PAIN, EPIGASTRIC  789.06    Resolved    Shey Salcido     Abdominal pain, epigastric   

 

 HYPERTENSION  401.1    Active    Shey Salcido     
Benign essential hypertension   

 

 PSORIASIS  696.1    Resolved    Shey Salcido     
Other psoriasis   

 

 ONYCHOMYCOSIS, TOENAILS  110.1    Resolved    Shey Salcido     Dermatophytosis of nail   

 

 MIGRAINE VARIANT  346.20    Active    Shey Salcido
     Variants of migraine, not elsewhere classified, without mention of 
intractable migraine, without mention of status migrainosus   

 

 ABNORMAL MAMMOGRAM  793.80  2012/10/15  Resolved    Shey Salcido     Abnormal mammogram, unspecified   

 

 BRONCHITIS  490    Resolved    Shey Salcido     
Bronchitis, not specified as acute or chronic   

 

 WHEEZING  786.07    Resolved    Shey Salcido     
Wheezing   

 

 SINUSITIS, SPHENOIDAL, ACUTE  461.3    Resolved    Shey Salcido     Acute sphenoidal sinusitis   

 

 PHARYNGITIS, ACUTE  462    Resolved    Shey Salcido     Acute pharyngitis   

 

 CHEST PAIN, ATYPICAL  786.59    Active    Shey Salcido     Other chest pain   

 

 COUGH  786.2    Active    Shey Salcido     Cough   

 

 ALLERGIC RHINITIS, SEASONAL  477.0    Active    Shey Salcido     Allergic rhinitis due to pollen   

 

 DYSPNEA  786.09    Active    Shey Salcido     
Other dyspnea and respiratory abnormality   







Medication List







 Medication  Instructions  Start Date  Stop Date  Generic Name  NDC  Status  
Provider  Patient Instruction

 

 MINOCIN 100 MG CAPS  1 PO BID    MINOCYCLINE HCL  
[id number]  Active  Shey Salcido   

 

 DIFLUCAN 100 MG TAB  1 PO daily for three days for yeast infection.    FLUCONAZOLE  [id number]  No Longer Active  Shey Salcido   

 

 PROAIR  (90 BASE) MCG/ACT AERS  2 puff Q4 hrs prn wheezing   
    ALBUTEROL SULFATE  43064036158  Active  Shey Salcido   

 

 PREDNISONE 10 MG TAB  2 PO at one time once daily for two days, then 1 PO 
daily for two days.    PREDNISONE  [id number]  No Longer 
Active  Shey Salcido   

 

 SUDAFED 30 MG TAB  1 PO TID prn congestion    
PSEUDOEPHEDRINE HCL  15372546541  No Longer Active  Shey Salcido   

 

 POTASSIUM CHLORIDE ER 20 MEQ CR-TABS  1 PO DAILY       POTASSIUM 
CHLORIDE  [id number]  Active  Jennifer Hatfield   

 

 LAMISIL 250 MG TAB  1 po daily    TERBINAFINE HCL  
11675292835  No Longer Active  Shey Salcido   

 

 IMITREX 50 MG TABS  1 po at first sign of migrane    
SUMATRIPTAN SUCCINATE  09885696803  No Longer Active  Shey Salcido   

 

 NAFTIN 2 % CREA  apply small amount to affected areas bid    NAFTIFINE HCL  77224799896  No Longer Active  Shey Salcido   

 

 TRIAMCINOLONE ACETONIDE 0.5 % CREA  apply a small amount to affected area bid 
for 2 weeks    TRIAMCINOLONE ACETONIDE  58766436093  No 
Longer Active  Shey Salcido   

 

 NORVASC 2.5 MG TAB  1 PO QD       AMLODIPINE BESYLATE  79186981125  
Active  Jennifer Hatfield   

 

 CEFTIN 500 MG TABS  1 PBID for 30 days    CEFUROXIME 
AXETIL  35000059021  No Longer Active  Ashley MASON'S NASAL SPRAY (DEXAMETHASONE, GENTAMICIN, SALINE)  2 puffs each 
nostril TID for 10 days    DR. CLARK NASAL SPRAY (
DEXAMETHASONE, GENTAMICIN, SALINE)     No Longer Active  Ashley Perez   

 

 DIFLUCAN 100 MG TAB  1 PO daily as directed    
FLUCONAZOLE  70259173716  No Longer Active  Ashley Perez   

 

 MAGIC MOUTHWASH  1 tsp PO QID    MAGIC MOUTHWASH     No 
Longer Active  Shey Natividad MASON'S NASAL SPRAY (DEXAMETHASONE, GENTAMICIN, SALINE)  2 puffs each 
nostril TID for 10 days    2013/02/15  DR. CLARK NASAL SPRAY (
DEXAMETHASONE, GENTAMICIN, SALINE)     No Longer Active  Shey Natividad Salcido   

 

 HYDROCODONE-HOMATROPINE 5-1.5 MG/5ML SYRP  1 teaspoon PO Q6hrs prn    HYDROCODONE-HOMATROPINE  03652319873  No Longer Active  Shey 
Natividad Salcido   

 

 TESSALON 200 MG CAPS  1 PO TID prn cough    BENZONATATE  
[id number]  No Longer Active  Shey Natividad Salcido   

 

 LEVAQUIN 500 MG TAB  1 PO QD    LEVOFLOXACIN  
82738258326  No Longer Active  Shey Natividad Salcido   

 

 FIORINAL 325-50-40 MG CAP  1 PO Q4hrs prn headache       ASPIRIN-
CAFFEINE-BUTALBITAL  74090762395  Active  Shey Natividad Salcido   

 

 LAMISIL 250 MG TAB  1 PO daily    TERBINAFINE HCL  
37772613639  No Longer Active  Shey Natividad Salcido   

 

 NEEVO 29-1-0.4 MG TABS  1 PO daily       PRENAT VIT-FEPOLY-METHYLFOL-
FA  28648175135  No Longer Active  Shey Natividad Salcido   

 

 LUPRON DEPOT 11.25 MG KIT  1 injection q 3months       LEUPROLIDE 
ACETATE (3 MONTH)  53765372853  No Longer Active  Shey Natividad Salcido   

 

 XANAX 0.25 MG TABS  1/2 to 1 PO Q6hrs prn  2009/07/10    ALPRAZOLAM  
[id number]  No Longer Active  Shey Natividad Salcido   

 

 LORTAB 5 5-500 MG TABS  1 to 2 PO Q6hrs prn    
ACETAMINOPHEN-HYDROCODONE  [id number]  No Longer Active  Sheyjerod Salcido 
  

 

 PRENATAL VITAMINS  TABS       PRENATAL MV & MIN W/FE-FA 
TABS  49735300742  No Longer Active  Shey Natividad Omari   

 

 LAMISIL 250 MG TABS  1 po daily       TERBINAFINE HCL  55777091167  
No Longer Active  Shey Natividad Salcido   

 

 MAGNESIUM OXIDE 400 MG CAPS  1 PO daily    MAGNESIUM 
OXIDE  48320435100  No Longer Active  Shey Natividad Salcido   

 

 K-DUR 20 MEQ TAB CR  1 PO daily    POTASSIUM CHLORIDE   
  No Longer Active  Shey Natividad Omari   

 

 DYAZIDE 37.5-25 MG CAP  1 PO daily       TRIAMTERENE-HCTZ  
05600258989  Active  Jennifer Hatfield   

 

 MAGNESIUM OXIDE 400 MG CAPS  1 po daily  2009/07/10     MAGNESIUM OXIDE  
61377482816  Active  Jennifer Hatfield   

 

 DYAZIDE 37.5-25 MG CAP  1 PO daily     2009/07/10  TRIAMTERENE-HCTZ  
[id number]  No Longer Active  Shey Natividad Omari   

 

 FEXOFENADINE  MG TABS  1 PO daily        FEXOFENADINE HCL  80015637206  
Active  Shey Natividad Omari   







Vital Signs







 Date  Name  Value  Unit  Range  Description

 

   blood pressure, diastolic - 8462-4  86  mm[Hg]     BP aceves

 

   blood pressure, systolic - 8480-6  134  mm[Hg]     BP sys

 

   pulse rate E&M - 8867-4  68  /min     Heart rate

 

   respiratory rate E&M - 9279-1  14  /min     Resp rate

 

   temperature E&M  98.9  [degF]     Body temperature

 

   weight E&M - 3141-9  168  [lb_av]     Weight Measured

 

   blood pressure, diastolic - 8462-4  70  mm[Hg]     BP aceves

 

   blood pressure, systolic - 8480-6  140  mm[Hg]     BP sys

 

   pulse rate E&M - 8867-4  84  /min     Heart rate

 

   respiratory rate E&M - 9279-1  14  /min     Resp rate

 

   temperature E&M  98.6  [degF]     Body temperature

 

   weight E&M - 3141-9  170  [lb_av]     Weight Measured







Diagnostic Results







 Date  Name  Value  Unit  Range  Description

 

 Clinical Lists Update: CBC,CMP,FLP,TSH - Chemistry 

 

   Estimated Glomerular Filtration Rate (calc)  96  mL/min/1.73m2    
  

 

   glucose, plasma fasting  99  mg/dL      

 

   albumin, serum  4.3  g/dL      

 

   alkaline phosphatase, serum  58  U/L      

 

   urea nitrogen, blood  15  mg/dL      

 

   calcium, serum  9.5  mg/dL      

 

   chloride, serum  105  mmol/L      

 

   cholesterol, serum  156  mg/dL      

 

   cholesterol/HDL ratio, serum, percent  3.3         

 

   anion gap, serum  13         

 

   sodium, serum  140  mmol/L      

 

   triglyceride, serum, fasting  76  mg/dL      

 

   bilirubin, serum, total  0.3  mg/dL      

 

   alanine aminotransferase (SGPT), serum  9  U/L      

 

   aspartate aminotransferase (SGOT), serum  14  U/L      

 

   protein, total, serum  6.9  g/dL      

 

   potassium, serum  4.0  mmol/L      

 

   LDL cholesterol, serum  93  mg/dL      

 

   thyroid stimulating hormone, serum  1.55  u[iU]/mL      

 

   HDL cholesterol, serum  48.0  mg/dL      

 

   creatinine, serum  0.7  mg/dL      

 

   carbon dioxide, venous blood  26.0  mmol/L      

 

 Clinical Lists Update: CBC,CMP,FLP,TSH - Hematology 

 

   erythrocyte (RBC) count  4.76  10*6/mm3      

 

   leukocyte count, blood  5.9  10*3/mm3      

 

   mean corpuscular volume, RBC  89  fL      

 

   red blood cell distribution width  13.2  %      

 

   hemoglobin, blood  13.8  g/dL      

 

   platelet count  251  10*3/mm3      

 

   hematocrit, blood  42  %      







Encounters







 Code  Encounter  Date  Provider  Facility

 

 CPT-30580  Ofc Vst, Est Level III   15:19:53 CDT  Shey Natividad Salcido  [place]

 

 CPT-55221  Ofc Vst, Est Level IV   17:17:47 CDT  Shey Natividad 
Omari Salcido, DO, FACP

 

 CPT-24664  Ofc Vst, Est Level IV   12:58:28 CST  Shey Natividad 
Omari Salcido, DO, FACP

 

 CPT-80955  Ofc Vst, Est Level III   15:21:05 CDT  Shey Natividad 
Omari Salcido, DO, FACP

 

 CPT-59335  Ofc Vst, Est Level III   19:52:46 CST  Shey Natividad 
Omari Salcido DO, FACP

 

 CPT-79443  Ofc Vst, Est Level III   14:54:18 CST  [facility]

 

 CPT-98717  Ofc Vst, Est Level IV   16:08:18 CDT  [facility]

 

 CPT-50108  Ofc Vst, Est Level III   10:37:17 CDT  Shey Natividad Angelasandi Salcido, DO, FACP

 

 CPT-37026  Ofc Vst, Est Level IV   15:57:52 CDT  Shey Natividad 
Omari Salcido, DO, FACP

 

 CPT-73769  Ofc Vst, Est Level IV   16:27:32 CDT  Shey Natividad 
Salcido  [facility]

 

 CPT-36714  Ofc Vst, Est Level IV  2009/07/10 10:30:06 CDT  Shey Natividad 
Omari Salcido, DO, FACP

 

 CPT-29721  Ofc Vst, New Level IV   15:56:53 CDT  [facility] 
Salcido  [facility]

## 2017-11-29 NOTE — DISCHARGE INST-POST CATH
Discharge Inst-CATH


Post Cardiac Cath D/C Inst


Follow Up/Plan


Appointment with Dr. Michaud's office in 2-4 weeks


CARDIAC CATH DISCHARGE INSTRUCTIONS





*Hold Metformin for 48 hours post heart cath.





ACTIVITY





* Go Home directly and rest.


* Limit activity of the leg (or wrist if it was used) for 7 days including 

aerobics, swimming,


   jogging, bicycling, etc.


* Restrict stair-climbing for 7 days if possible, if not, climb up with your non

-cath leg, then


   bring together on the same step.


* Avoid lifting, pushing, pulling or excessive movement of the affected 

extremity for 7 days.


* Customary sexual activity may be resumed after 2 days-use caution not to use 

a position  


   that strains or causes pain to the affected extremity.


* No driving for 24 hours.


* NO SMOKING. 


* Avoid straining for bowel movements for 7 days.


* Gentle walking on level ground is allowed.


* Returning to work will depend on the type of procedure and the results. Your 

doctor will discuss


   this with you.





CALL YOUR DOCTOR FOR ANY OF THE FOLLOWING:





*If bleeding from the puncture site occurs- Apply gentle pressure to site with 

clean cloth and call


   your doctor or EMS.


* If a knot or lump forms under the skin, increases in size, or causes pain.


* If bruising appears to be worsening or moving further down your leg instead 

of disappearing.


* Temperature above 101 F.





CARE OF YOUR GROIN INCISION;





* Bruising or purple discoloration of the skin near the puncture site is common.


* You may shower only, no bathtub bathing for 5 days.  Be careful to avoid 

slipping as your


   leg may feel stiff.


* If a closure device was used on your femoral artery, please see the attached 

guide regarding


   care of the device and your leg.


* REMOVE the dressing from your groin the next day after your procedure in the 

shower.





CARE OF YOUR WRIST INCISION;





* Bruising or purple discoloration of the skin near the puncture site is common.


* You may shower.


* DO NOT submerge wrist.


* Remove dressing in 24 hours.











CHRISTIAN MICHAUD MD Nov 29, 2017 15:01

## 2017-11-29 NOTE — CARDIAC CATH REPORT
Cardiac Cath Report


Physician (s)/Assistant (s)


Physician


CHRISTIAN NAVARRO MD





Pre-Procedure Diagnosis


Pre-Procedure Diagnosis:  chest pain





Post-Procedure Note


Procedure Start Date:  Nov 29, 2017


Name of Procedure:  


left heart catheterization on 3458


Findings/Procedure Note


PROCEDURE NOTE:


After explaining the procedure to the patient, all pros and cons were explained,


all questions were answered.  The patient signed the consent and then she  was


placed on the cardiac catheterization laboratory.





The patient was placed on the cardiac catheterization laboratory.


wrist was prepped SL fashion local anesthesia was used. 


Sheath placed in the artery.


Griffin catheter was used Taxus left ventricular left ventricular gram was done, 

left carotid system, exchange in 2F are 5 Yoruba catheter and the right 

coronary system was evaluated


At the end of the procedure the sheath was removed.


Closure device was used





FINDINGS:





Hemodynamics 


/11, end-diastolic pressure of 11


Aorta 109/72 mean of 87





ANATOMY:


Left Main is free of obstructive disease


Left Anterior Descending is free of obstructive disease


Left Circumflex is free of obstructive disease


Right Coronory Artery is dominant artery with mild disease nonobstructive 

disease


LV Gram is normal in size with normal contraction.  Ejection fraction 60 percent





CONCLUSION:


1.  Mild coronary artery disease no significant obstructive disease


2.  Normal left ventricular size and systolic function estimated ejection 

fraction 60 percent











DISCUSSION AND RECOMMENDATION:


medical therapy is recommended no intervention is warranted


Anesthesia Type:  Conscious Sedation


Estimated blood loss (mL):  5 ml


Contrast Amount:  55 ml


Total Radiation Dose:  190 mGy





Post-Procedure Diagnosis


Post-operative diagnosis:  


coronary artery disease


Chest pain nonspecific etiology














CHRISTIAN NAVARRO MD Nov 29, 2017 14:59

## 2017-11-29 NOTE — DIAGNOSTIC IMAGING REPORT
INDICATION: Cardiac disease.



The lungs are clear. The heart size is within normal limits. No

effusion or pneumothorax.



IMPRESSION: Negative.



Dictated by: 



  Dictated on workstation # [id number]

## 2017-11-29 NOTE — XMS REPORT
[facility] Clinical Summary

 Created on: 2015



César Nay KATTY

External Reference #: [id number]

: 1965

Sex: Female



Demographics







 Address  [address] [place]  [address]

 

 Home Phone  [phone]

 

 Preferred Language  Unknown

 

 Marital Status  M

 

 [employer] Affiliation  Unknown

 

 Race  White

 

 Ethnic Group  Not  or 





Author







 Author  User, NAE Puckett  [facility]

 

 Address  Unknown

 

 Phone  [phone]







Allergies, Adverse Reactions, Alerts







 Allergy Name  Reaction Description  Start Date  Severity  Status  Provider

 

 No Known Allergies             Shey Salcido







Conditions or Problems







 Problem Name  Problem Code  Onset Date  Status  Entry Date  Provider  Comment  
Standard Description  Annotate

 

 RENAL CALCULUS  592.0    Active    Shey Salcido   
  Calculus of kidney   

 

 WEIGHT GAIN, ABNORMAL  783.1    Resolved    Shey Salcido     Abnormal weight gain   

 

 BACK PAIN  724.5    Resolved    Shey Salcido     
Backache, unspecified   

 

 MUSCLE PAIN  729.1    Resolved    Shey Salcido     
Myalgia and myositis, unspecified   

 

 AMENORRHEA  626.0    Resolved    Shey Salcido     
Absence of menstruation   

 

 NEUROPATHY, IDIOPATHIC PERIPHERAL  356.9    Resolved    
Shey Salcido     Unspecified idiopathic peripheral neuropathy   

 

 DIARRHEA, RECURRENT  787.91    Resolved    Shey Salcido     Diarrhea   

 

 MENOPAUSAL SYNDROME  627.0    Active    Shey Salcido     Premenopausal menorrhagia   

 

 MALABSORPTION SYNDROME  579.9    Resolved    Shey Salcido     Unspecified intestinal malabsorption   

 

 HYPOKALEMIA  276.8    Active    Shey Salcido     
Hypopotassemia   

 

 HYPOMAGNESEMIA  275.2    Active    Shey Salcido   
  Disorders of magnesium metabolism   

 

 PALPITATIONS, OCCASIONAL  785.1  2009/07/10  Resolved    Shey Salcido     Palpitations   

 

 ABDOMINAL PAIN, EPIGASTRIC  789.06    Resolved    Shey Salcido     Abdominal pain, epigastric   

 

 HYPERTENSION  401.1    Active    Shey Salcido     
Benign essential hypertension   

 

 PSORIASIS  696.1    Resolved    Shey Salcido     
Other psoriasis   

 

 ONYCHOMYCOSIS, TOENAILS  110.1    Resolved    Shey Salcido     Dermatophytosis of nail   

 

 MIGRAINE VARIANT  346.20    Active    Shey Salcido
     Variants of migraine, not elsewhere classified, without mention of 
intractable migraine, without mention of status migrainosus   

 

 ABNORMAL MAMMOGRAM  793.80  2012/10/15  Resolved    Shey Salcido     Abnormal mammogram, unspecified   

 

 BRONCHITIS  490    Resolved    Shey Salcido     
Bronchitis, not specified as acute or chronic   

 

 WHEEZING  786.07    Resolved    Shey Salcido     
Wheezing   

 

 SINUSITIS, SPHENOIDAL, ACUTE  461.3    Resolved    Shey Salcido     Acute sphenoidal sinusitis   

 

 PHARYNGITIS, ACUTE  462    Resolved    Shey Salcido     Acute pharyngitis   

 

 CHEST PAIN, ATYPICAL  786.59    Active    Shey Salcido     Other chest pain   

 

 COUGH  786.2    Active    Shey Salcido     Cough   

 

 ALLERGIC RHINITIS, SEASONAL  477.0    Active    Shey Salcido     Allergic rhinitis due to pollen   

 

 DYSPNEA  786.09    Active    Shey Salcido     
Other dyspnea and respiratory abnormality   







Medication List







 Medication  Instructions  Start Date  Stop Date  Generic Name  NDC  Status  
Provider  Patient Instruction

 

 MINOCIN 100 MG CAPS  1 PO BID    MINOCYCLINE HCL  
[id number]  No Longer Active  Shey Salcido   

 

 DIFLUCAN 100 MG TAB  1 PO daily for three days for yeast infection.    FLUCONAZOLE  [id number]  No Longer Active  Shey Salcido   

 

 PROAIR  (90 BASE) MCG/ACT AERS  2 puff Q4 hrs prn wheezing   
    ALBUTEROL SULFATE  [id number]  Active  Shey Salcido   

 

 PREDNISONE 10 MG TAB  2 PO at one time once daily for two days, then 1 PO 
daily for two days.    PREDNISONE  [id number]  No Longer 
Active  Shey Salcido   

 

 SUDAFED 30 MG TAB  1 PO TID prn congestion    
PSEUDOEPHEDRINE HCL  [id number]  No Longer Active  Shey Salcido   

 

 POTASSIUM CHLORIDE ER 20 MEQ CR-TABS  1 PO DAILY       POTASSIUM 
CHLORIDE  [id number]  Active  Jennifer Hatfield   

 

 LAMISIL 250 MG TAB  1 po daily    TERBINAFINE HCL  
[id number]  No Longer Active  Shey Salcido   

 

 IMITREX 50 MG TABS  1 po at first sign of migrane    
SUMATRIPTAN SUCCINATE  [id number]  No Longer Active  Shey Salcido   

 

 NAFTIN 2 % CREA  apply small amount to affected areas bid    NAFTIFINE HCL  [id number]  No Longer Active  Shey Salcido   

 

 TRIAMCINOLONE ACETONIDE 0.5 % CREA  apply a small amount to affected area bid 
for 2 weeks    TRIAMCINOLONE ACETONIDE  [id number]  No 
Longer Active  Shey Salcido   

 

 NORVASC 2.5 MG TAB  1 PO QD       AMLODIPINE BESYLATE  [id number]  
Active  Jennifer Hatfield   

 

 CEFTIN 500 MG TABS  1 PBID for 30 days    CEFUROXIME 
AXETIL  [id number]  No Longer Active  Ashley MAOSN'S NASAL SPRAY (DEXAMETHASONE, GENTAMICIN, SALINE)  2 puffs each 
nostril TID for 10 days    DR. CLARK NASAL SPRAY (
DEXAMETHASONE, GENTAMICIN, SALINE)     No Longer Active  Ashley Perez   

 

 DIFLUCAN 100 MG TAB  1 PO daily as directed    
FLUCONAZOLE  [id number]  No Longer Active  Ashley Perez   

 

 MAGIC MOUTHWASH  1 tsp PO QID    MAGIC MOUTHWASH     No 
Longer Active  Shey Natividad MASON'S NASAL SPRAY (DEXAMETHASONE, GENTAMICIN, SALINE)  2 puffs each 
nostril TID for 10 days    2013/02/15  DR. CLARK NASAL SPRAY (
DEXAMETHASONE, GENTAMICIN, SALINE)     No Longer Active  Shey Natividad Salcido   

 

 HYDROCODONE-HOMATROPINE 5-1.5 MG/5ML SYRP  1 teaspoon PO Q6hrs prn    HYDROCODONE-HOMATROPINE  [id number]  No Longer Active  Shey 
Natividad Salcido   

 

 TESSALON 200 MG CAPS  1 PO TID prn cough    BENZONATATE  
[id number]  No Longer Active  Shey Natividad Salcido   

 

 LEVAQUIN 500 MG TAB  1 PO QD    LEVOFLOXACIN  
[id number]  No Longer Active  Shey Natividad Salcido   

 

 FIORINAL 325-50-40 MG CAP  1 PO Q4hrs prn headache       ASPIRIN-
CAFFEINE-BUTALBITAL  [id number]  Active  Shey Natividad Salcido   

 

 LAMISIL 250 MG TAB  1 PO daily    TERBINAFINE HCL  
[id number]  No Longer Active  Shey Natividad Salcido   

 

 NEEVO 29-1-0.4 MG TABS  1 PO daily       PRENAT VIT-FEPOLY-METHYLFOL-
FA  [id number]  No Longer Active  Shey Natividad Salcido   

 

 LUPRON DEPOT 11.25 MG KIT  1 injection q 3months       LEUPROLIDE 
ACETATE (3 MONTH)  [id number]  No Longer Active  Shey Natividad Salcido   

 

 XANAX 0.25 MG TABS  1/2 to 1 PO Q6hrs prn  2009/07/10    ALPRAZOLAM  
[id number]  No Longer Active  Shey Natividad Salcido   

 

 LORTAB 5 5-500 MG TABS  1 to 2 PO Q6hrs prn    
ACETAMINOPHEN-HYDROCODONE  [id number]  No Longer Active  Shey Salcido 
  

 

 PRENATAL VITAMINS  TABS       PRENATAL MV & MIN W/FE-FA 
TABS  [id number]  No Longer Active  Shey Natividad Salcido   

 

 LAMISIL 250 MG TABS  1 po daily       TERBINAFINE HCL  [id number]  
No Longer Active  Shey Natividad Salcido   

 

 MAGNESIUM OXIDE 400 MG CAPS  1 PO daily    MAGNESIUM 
OXIDE  [id number]  No Longer Active  Shey Natividad Salcido   

 

 K-DUR 20 MEQ TAB CR  1 PO daily    POTASSIUM CHLORIDE   
  No Longer Active  Shey Natividad Salcido   

 

 DYAZIDE 37.5-25 MG CAP  1 PO daily       TRIAMTERENE-HCTZ  
[id number]  Active  Jennifer Hatfield   

 

 MAGNESIUM OXIDE 400 MG CAPS  1 po daily  2009/07/10     MAGNESIUM OXIDE  
[id number]  Active  Jennifer Hatfield   

 

 DYAZIDE 37.5-25 MG CAP  1 PO daily     2009/07/10  TRIAMTERENE-HCTZ  
[id number]  No Longer Active  Shey Natividad Salcido   

 

 FEXOFENADINE  MG TABS  1 PO daily        FEXOFENADINE HCL  [id number]  
Active  Shey Natividad Salcido   







Vital Signs







 Date  Name  Value  Unit  Range  Description

 

   blood pressure, diastolic - 8462-4  86  mm[Hg]     BP aceves

 

   blood pressure, systolic - 8480-6  134  mm[Hg]     BP sys

 

   pulse rate E&M - 8867-4  68  /min     Heart rate

 

   respiratory rate E&M - 9279-1  14  /min     Resp rate

 

   temperature E&M  98.9  [degF]     Body temperature

 

   weight E&M - 3141-9  168  [lb_av]     Weight Measured

 

   blood pressure, diastolic - 8462-4  70  mm[Hg]     BP aceves

 

   blood pressure, systolic - 8480-6  140  mm[Hg]     BP sys

 

   pulse rate E&M - 8867-4  84  /min     Heart rate

 

   respiratory rate E&M - 9279-1  14  /min     Resp rate

 

   temperature E&M  98.6  [degF]     Body temperature

 

   weight E&M - 3141-9  170  [lb_av]     Weight Measured







Diagnostic Results







 Date  Name  Value  Unit  Range  Description

 

 Clinical Lists Update: CBC,CMP,FLP,TSH - Chemistry 

 

   chloride, serum  105  mmol/L      

 

   anion gap, serum  13         

 

   sodium, serum  140  mmol/L      

 

   alanine aminotransferase (SGPT), serum  9  U/L      

 

   aspartate aminotransferase (SGOT), serum  14  U/L      

 

   protein, total, serum  6.9  g/dL      

 

   potassium, serum  4.0  mmol/L      

 

   thyroid stimulating hormone, serum  1.55  u[iU]/mL      

 

   triglyceride, serum, fasting  76  mg/dL      

 

   calcium, serum  9.5  mg/dL      

 

   urea nitrogen, blood  15  mg/dL      

 

   bilirubin, serum, total  0.3  mg/dL      

 

   alkaline phosphatase, serum  58  U/L      

 

   albumin, serum  4.3  g/dL      

 

   LDL cholesterol, serum  93  mg/dL      

 

   HDL cholesterol, serum  48.0  mg/dL      

 

   glucose, plasma fasting  99  mg/dL      

 

   Estimated Glomerular Filtration Rate (calc)  96  mL/min/1.73m2    
  

 

   creatinine, serum  0.7  mg/dL      

 

   carbon dioxide, venous blood  26.0  mmol/L      

 

   cholesterol, serum  156  mg/dL      

 

   cholesterol/HDL ratio, serum, percent  3.3         

 

 Clinical Lists Update: CBC,CMP,FLP,TSH - Hematology 

 

   red blood cell distribution width  13.2  %      

 

   leukocyte count, blood  5.9  10*3/mm3      

 

   platelet count  251  10*3/mm3      

 

   erythrocyte (RBC) count  4.76  10*6/mm3      

 

   hemoglobin, blood  13.8  g/dL      

 

   hematocrit, blood  42  %      

 

   mean corpuscular volume, RBC  89  fL      

 

 Clinical Lists Update: CMP,FLP - Chemistry 

 

   albumin, serum  4.2  g/dL      

 

   LDL cholesterol, serum  98  mg/dL      

 

   HDL cholesterol, serum  51.0  mg/dL      

 

   triglyceride, serum, fasting  115  mg/dL      

 

   glucose, plasma fasting  98  mg/dL      

 

   Estimated Glomerular Filtration Rate (calc)  79  mL/min/1.73m2    
  

 

   creatinine, serum  0.8  mg/dL      

 

   carbon dioxide, venous blood  29.0  mmol/L      

 

   calcium, serum  9.5  mg/dL      

 

   cholesterol, serum  172  mg/dL      

 

   protein, total, serum  6.8  g/dL      

 

   cholesterol/HDL ratio, serum, percent  3.4         

 

   urea nitrogen, blood  15  mg/dL      

 

   aspartate aminotransferase (SGOT), serum  16  U/L      

 

   bilirubin, serum, total  0.4  mg/dL      

 

   potassium, serum  3.9  mmol/L      

 

   anion gap, serum  9         

 

   sodium, serum  136  mmol/L      

 

   chloride, serum  102  mmol/L      

 

   alanine aminotransferase (SGPT), serum  18  U/L      

 

   alkaline phosphatase, serum  75  U/L      







Encounters







 Code  Encounter  Date  Provider  Facility

 

 CPT-22345  Ofc Vst, Est Level III   15:19:53 CDT  Shey Salcido  [facility]

 

 CPT-32227  Ofc Vst, Est Level IV   17:17:47 CDT  Shey Salcido DO [facility]

 

 CPT-90707  Ofc Vst, Est Level IV   12:58:28 CST  Shey Salcido DO [facility]

 

 CPT-60968  Ofc Vst, Est Level III   15:21:05 CDT  Shey Salcido DO FACP

 

 CPT-32346  Ofc Vst, Est Level III   19:52:46 CST  [facility] Natividad Angelasandi Kat S DO Omari, FACP

 

 CPT-88859  Ofc Vst, Est Level III   14:54:18 CST  Shey Natividad 
[facility]

 

 CPT-03243  Ofc Vst, Est Level IV   16:08:18 CDT  [facility]

 

 CPT-85668  Ofc Vst, Est Level III   10:37:17 CDT  Shey Natividad Angelasandi Kat S DO Omari, FACP

 

 CPT-52331  Ofc Vst, Est Level IV   15:57:52 CDT  Shey Natividad 
Omari Salcido DO, FACP

 

 CPT-55580  Ofc Vst, Est Level IV   16:27:32 CDT  [facility]

 

 CPT-14586  Ofc Vst, Est Level IV  2009/07/10 10:30:06 CDT  Shey Natividad 
Omari Salcido DO, FACP

 

 CPT-93659  Ofc Vst, New Level IV   15:56:53 CDT  [facility]

## 2017-11-29 NOTE — XMS REPORT
[facility] Clinical Summary

 Created on: 2015



César Nay KATTY

External Reference #: [id number]

: 1965

Sex: Female



Demographics







 Address  [address] [place]  [address]

 

 Home Phone  [email or web address]

 

 Preferred Language  Unknown

 

 Marital Status  M

 

 Evangelical Affiliation  Unknown

 

 Race  White

 

 Ethnic Group  Not  or 





Author







 Author  User, NAE Puckett  [facility]

 

 Address  Unknown

 

 Phone  [phone]







Allergies, Adverse Reactions, Alerts







 Allergy Name  Reaction Description  Start Date  Severity  Status  Provider

 

 No Known Allergies             Shey Salcido







Conditions or Problems







 Problem Name  Problem Code  Onset Date  Status  Entry Date  Provider  Comment  
Standard Description  Annotate

 

 RENAL CALCULUS  592.0    Active    Shey Salcido   
  Calculus of kidney   

 

 WEIGHT GAIN, ABNORMAL  783.1    Resolved    Shey Salcido     Abnormal weight gain   

 

 BACK PAIN  724.5    Resolved    Shey Salcido     
Backache, unspecified   

 

 MUSCLE PAIN  729.1    Resolved    Shey Salcido     
Myalgia and myositis, unspecified   

 

 AMENORRHEA  626.0    Resolved    Shey Salcido     
Absence of menstruation   

 

 NEUROPATHY, IDIOPATHIC PERIPHERAL  356.9    Resolved    
Shey Salcido     Unspecified idiopathic peripheral neuropathy   

 

 DIARRHEA, RECURRENT  787.91    Resolved    Shey Salcido     Diarrhea   

 

 MENOPAUSAL SYNDROME  627.0    Active    Shey Salcido     Premenopausal menorrhagia   

 

 MALABSORPTION SYNDROME  579.9    Resolved    Shey Salcido     Unspecified intestinal malabsorption   

 

 HYPOKALEMIA  276.8    Active    Shey Salcido     
Hypopotassemia   

 

 HYPOMAGNESEMIA  275.2    Active    Shey Salcido   
  Disorders of magnesium metabolism   

 

 PALPITATIONS, OCCASIONAL  785.1  2009/07/10  Resolved    Shey Salcido     Palpitations   

 

 ABDOMINAL PAIN, EPIGASTRIC  789.06    Resolved    Shey Salcido     Abdominal pain, epigastric   

 

 HYPERTENSION  401.1    Active    Shey Salcido     
Benign essential hypertension   

 

 PSORIASIS  696.1    Resolved    Shey Salcido     
Other psoriasis   

 

 ONYCHOMYCOSIS, TOENAILS  110.1    Resolved    Shey Salcido     Dermatophytosis of nail   

 

 MIGRAINE VARIANT  346.20    Active    Shey Salcido
     Variants of migraine, not elsewhere classified, without mention of 
intractable migraine, without mention of status migrainosus   

 

 ABNORMAL MAMMOGRAM  793.80  2012/10/15  Resolved    Shey Salcido     Abnormal mammogram, unspecified   

 

 BRONCHITIS  490    Resolved    Shey Salcido     
Bronchitis, not specified as acute or chronic   

 

 WHEEZING  786.07    Resolved    Shey Salcido     
Wheezing   

 

 SINUSITIS, SPHENOIDAL, ACUTE  461.3    Resolved    Shey Salcido     Acute sphenoidal sinusitis   

 

 PHARYNGITIS, ACUTE  462    Resolved    Shey Salcido     Acute pharyngitis   

 

 CHEST PAIN, ATYPICAL  786.59    Active    Shey Salcido     Other chest pain   

 

 COUGH  786.2    Active    Shey Salcido     Cough   

 

 ALLERGIC RHINITIS, SEASONAL  477.0    Active    Shey Salcido     Allergic rhinitis due to pollen   

 

 DYSPNEA  786.09    Active    Shey Salcido     
Other dyspnea and respiratory abnormality   







Medication List







 Medication  Instructions  Start Date  Stop Date  Generic Name  NDC  Status  
Provider  Patient Instruction

 

 MINOCIN 100 MG CAPS  1 PO BID    MINOCYCLINE HCL  
[id number]  Active  Shey Salcido   

 

 DIFLUCAN 100 MG TAB  1 PO daily for three days for yeast infection.    FLUCONAZOLE  [id number]  No Longer Active  Shey Salcido   

 

 PROAIR  (90 BASE) MCG/ACT AERS  2 puff Q4 hrs prn wheezing   
    ALBUTEROL SULFATE  [id number]  Active  Shey Salcido   

 

 PREDNISONE 10 MG TAB  2 PO at one time once daily for two days, then 1 PO 
daily for two days.    PREDNISONE  [id number]  No Longer 
Active  Shey Salcido   

 

 SUDAFED 30 MG TAB  1 PO TID prn congestion    
PSEUDOEPHEDRINE HCL  [id number]  No Longer Active  Shey Salcido   

 

 POTASSIUM CHLORIDE ER 20 MEQ CR-TABS  1 PO DAILY       POTASSIUM 
CHLORIDE  [id number]  Active  Jennifer Hatfield   

 

 LAMISIL 250 MG TAB  1 po daily    TERBINAFINE HCL  
[id number]  No Longer Active  Shey Salcido   

 

 IMITREX 50 MG TABS  1 po at first sign of migrane    
SUMATRIPTAN SUCCINATE  [id number]  No Longer Active  Shey Salcido   

 

 NAFTIN 2 % CREA  apply small amount to affected areas bid    NAFTIFINE HCL  [id number]  No Longer Active  Shey Salcido   

 

 TRIAMCINOLONE ACETONIDE 0.5 % CREA  apply a small amount to affected area bid 
for 2 weeks    TRIAMCINOLONE ACETONIDE  [id number]  No 
Longer Active  Shey Salcido   

 

 NORVASC 2.5 MG TAB  1 PO QD       AMLODIPINE BESYLATE  [id number]  
Active  Jennifer Hatfield   

 

 CEFTIN 500 MG TABS  1 PBID for 30 days    CEFUROXIME 
AXETIL  [id number]  No Longer Active  Ashley MASON'S NASAL SPRAY (DEXAMETHASONE, GENTAMICIN, SALINE)  2 puffs each 
nostril TID for 10 days    DR. CLARK NASAL SPRAY (
DEXAMETHASONE, GENTAMICIN, SALINE)     No Longer Active  Ashley Perez   

 

 DIFLUCAN 100 MG TAB  1 PO daily as directed    
FLUCONAZOLE  [id number]  No Longer Active  Ashley Perez   

 

 MAGIC MOUTHWASH  1 tsp PO QID    MAGIC MOUTHWASH     No 
Longer Active  Shey Natividad MASON'S NASAL SPRAY (DEXAMETHASONE, GENTAMICIN, SALINE)  2 puffs each 
nostril TID for 10 days    2013/02/15  DR. CLARK NASAL SPRAY (
DEXAMETHASONE, GENTAMICIN, SALINE)     No Longer Active  Shey Natividad Salcido   

 

 HYDROCODONE-HOMATROPINE 5-1.5 MG/5ML SYRP  1 teaspoon PO Q6hrs prn    HYDROCODONE-HOMATROPINE  [id number]  No Longer Active  Shey 
Natividad Salcido   

 

 TESSALON 200 MG CAPS  1 PO TID prn cough    BENZONATATE  
[id number]  No Longer Active  Shey Natividad Salcido   

 

 LEVAQUIN 500 MG TAB  1 PO QD    LEVOFLOXACIN  
[id number]  No Longer Active  Shey Natividad Salcido   

 

 FIORINAL 325-50-40 MG CAP  1 PO Q4hrs prn headache       ASPIRIN-
CAFFEINE-BUTALBITAL  [id number]  Active  Shey Natividad Salcido   

 

 LAMISIL 250 MG TAB  1 PO daily    TERBINAFINE HCL  
[id number]  No Longer Active  Shey Natividad Salcido   

 

 NEEVO 29-1-0.4 MG TABS  1 PO daily       PRENAT VIT-FEPOLY-METHYLFOL-
FA  [id number]  No Longer Active  Shey Natividad Salcido   

 

 LUPRON DEPOT 11.25 MG KIT  1 injection q 3months       LEUPROLIDE 
ACETATE (3 MONTH)  [id number]  No Longer Active  Shey Natividad Salcido   

 

 XANAX 0.25 MG TABS  1/2 to 1 PO Q6hrs prn  2009/07/10    ALPRAZOLAM  
[id number]  No Longer Active  Shey Natividad Salcido   

 

 LORTAB 5 5-500 MG TABS  1 to 2 PO Q6hrs prn    
ACETAMINOPHEN-HYDROCODONE  [id number]  No Longer Active  Sheyjerod Salcido 
  

 

 PRENATAL VITAMINS  TABS       PRENATAL MV & MIN W/FE-FA 
TABS  [id number]  No Longer Active  Shey Natividad Salcido   

 

 LAMISIL 250 MG TABS  1 po daily       TERBINAFINE HCL  [id number]  
No Longer Active  Shey Natividad Salcido   

 

 MAGNESIUM OXIDE 400 MG CAPS  1 PO daily    MAGNESIUM 
OXIDE  [id number]  No Longer Active  Shey Natividad Salcido   

 

 K-DUR 20 MEQ TAB CR  1 PO daily    POTASSIUM CHLORIDE   
  No Longer Active  Shey Natividad Omari   

 

 DYAZIDE 37.5-25 MG CAP  1 PO daily       TRIAMTERENE-HCTZ  
[id number]  Active  Jennifer Hatfield   

 

 MAGNESIUM OXIDE 400 MG CAPS  1 po daily  2009/07/10     MAGNESIUM OXIDE  
[id number]  Active  Jennifer Hatfield   

 

 DYAZIDE 37.5-25 MG CAP  1 PO daily     2009/07/10  TRIAMTERENE-HCTZ  
[id number]  No Longer Active  Shey Natividad Salcido   

 

 FEXOFENADINE  MG TABS  1 PO daily        FEXOFENADINE HCL  [id number]  
Active  Shey Natividad Salcido   







Vital Signs







 Date  Name  Value  Unit  Range  Description

 

   blood pressure, diastolic - 8462-4  86  mm[Hg]     BP aceves

 

   blood pressure, systolic - 8480-6  134  mm[Hg]     BP sys

 

   pulse rate E&M - 8867-4  68  /min     Heart rate

 

   respiratory rate E&M - 9279-1  14  /min     Resp rate

 

   temperature E&M  98.9  [degF]     Body temperature

 

   weight E&M - 3141-9  168  [lb_av]     Weight Measured

 

   blood pressure, diastolic - 8462-4  70  mm[Hg]     BP aceves

 

   blood pressure, systolic - 8480-6  140  mm[Hg]     BP sys

 

   pulse rate E&M - 8867-4  84  /min     Heart rate

 

   respiratory rate E&M - 9279-1  14  /min     Resp rate

 

   temperature E&M  98.6  [degF]     Body temperature

 

   weight E&M - 3141-9  170  [lb_av]     Weight Measured







Diagnostic Results







 Date  Name  Value  Unit  Range  Description

 

 Clinical Lists Update: CBC,CMP,FLP,TSH - Chemistry 

 

   Estimated Glomerular Filtration Rate (calc)  96  mL/min/1.73m2    
  

 

   glucose, plasma fasting  99  mg/dL      

 

   albumin, serum  4.3  g/dL      

 

   alkaline phosphatase, serum  58  U/L      

 

   urea nitrogen, blood  15  mg/dL      

 

   calcium, serum  9.5  mg/dL      

 

   chloride, serum  105  mmol/L      

 

   cholesterol, serum  156  mg/dL      

 

   cholesterol/HDL ratio, serum, percent  3.3         

 

   anion gap, serum  13         

 

   sodium, serum  140  mmol/L      

 

   triglyceride, serum, fasting  76  mg/dL      

 

   bilirubin, serum, total  0.3  mg/dL      

 

   alanine aminotransferase (SGPT), serum  9  U/L      

 

   aspartate aminotransferase (SGOT), serum  14  U/L      

 

   protein, total, serum  6.9  g/dL      

 

   potassium, serum  4.0  mmol/L      

 

   LDL cholesterol, serum  93  mg/dL      

 

   thyroid stimulating hormone, serum  1.55  u[iU]/mL      

 

   HDL cholesterol, serum  48.0  mg/dL      

 

   creatinine, serum  0.7  mg/dL      

 

   carbon dioxide, venous blood  26.0  mmol/L      

 

 Clinical Lists Update: CBC,CMP,FLP,TSH - Hematology 

 

   erythrocyte (RBC) count  4.76  10*6/mm3      

 

   leukocyte count, blood  5.9  10*3/mm3      

 

   mean corpuscular volume, RBC  89  fL      

 

   red blood cell distribution width  13.2  %      

 

   hemoglobin, blood  13.8  g/dL      

 

   platelet count  251  10*3/mm3      

 

   hematocrit, blood  42  %      







Encounters







 Code  Encounter  Date  Provider  Facility

 

 CPT-38308  Ofc Vst, Est Level III   15:19:53 CDT  Shey Natividad Angelaner  [facility]

 

 CPT-94694  Ofc Vst, Est Level IV   17:17:47 CDT  Shey Natividad 
[facility] Salcido, DO, FACP

 

 CPT-48107  Ofc Vst, Est Level IV   12:58:28 CST  Shey Natividad 
Omari Salcido, DO, FACP

 

 CPT-41680  Ofc Vst, Est Level III   15:21:05 CDT  Shey Natividad 
Omari Salcido, DO, FACP

 

 CPT-01701  Ofc Vst, Est Level III   19:52:46 CST  Shey Natividad 
Omari Salcido DO, FACP

 

 CPT-11115  Ofc Vst, Est Level III   14:54:18 CST  [facility]

 

 CPT-24978  Ofc Vst, Est Level IV   16:08:18 CDT  [facility]

 

 CPT-05255  Ofc Vst, Est Level III   10:37:17 CDT  Shey Natividad 
Omari Salcido, DO, FACP

 

 CPT-63347  Ofc Vst, Est Level IV   15:57:52 CDT  Shey Natividad 
Omari Salcido, DO, FACP

 

 CPT-44750  Ofc Vst, Est Level IV   16:27:32 CDT  Shey Natividad 
Salcido  [facility]

 

 CPT-11537  Ofc Vst, Est Level IV  2009/07/10 10:30:06 CDT  Shey Natividad 
Omari Salcido, DO, FACP

 

 CPT-99405  Ofc Vst, New Level IV   15:56:53 CDT  [facility] 
Salcido  [facility]

## 2017-11-29 NOTE — XMS REPORT
[facility] Clinical Summary

 Created on: 2015



César Nay KATTY

External Reference #: [id number]

: 1965

Sex: Female



Demographics







 Address  [address] [place]  [address]

 

 Home Phone  [phone]

 

 Preferred Language  Unknown

 

 Marital Status  M

 

 [employer] Affiliation  Unknown

 

 Race  White

 

 Ethnic Group  Not  or 





Author







 Author  User, NAE Puckett  [facility]

 

 Address  Unknown

 

 Phone  [phone]







Allergies, Adverse Reactions, Alerts







 Allergy Name  Reaction Description  Start Date  Severity  Status  Provider

 

 No Known Allergies             Shey Salcido







Conditions or Problems







 Problem Name  Problem Code  Onset Date  Status  Entry Date  Provider  Comment  
Standard Description  Annotate

 

 RENAL CALCULUS  592.0    Active    Shey Salcido   
  Calculus of kidney   

 

 WEIGHT GAIN, ABNORMAL  783.1    Resolved    Shey Salcido     Abnormal weight gain   

 

 BACK PAIN  724.5    Resolved    Shey Salcido     
Backache, unspecified   

 

 MUSCLE PAIN  729.1    Resolved    Shey Salcido     
Myalgia and myositis, unspecified   

 

 AMENORRHEA  626.0    Resolved    Shey Salcido     
Absence of menstruation   

 

 NEUROPATHY, IDIOPATHIC PERIPHERAL  356.9    Resolved    
Shey Salcido     Unspecified idiopathic peripheral neuropathy   

 

 DIARRHEA, RECURRENT  787.91    Resolved    Shey Salcido     Diarrhea   

 

 MENOPAUSAL SYNDROME  627.0    Active    Shey Salcido     Premenopausal menorrhagia   

 

 MALABSORPTION SYNDROME  579.9    Resolved    Shey Salcido     Unspecified intestinal malabsorption   

 

 HYPOKALEMIA  276.8    Active    Shey Salcido     
Hypopotassemia   

 

 HYPOMAGNESEMIA  275.2    Active    Shey Salcido   
  Disorders of magnesium metabolism   

 

 PALPITATIONS, OCCASIONAL  785.1  2009/07/10  Resolved    Shey Salcido     Palpitations   

 

 ABDOMINAL PAIN, EPIGASTRIC  789.06    Resolved    Shey Salcido     Abdominal pain, epigastric   

 

 HYPERTENSION  401.1    Active    Shey Salcido     
Benign essential hypertension   

 

 PSORIASIS  696.1    Resolved    Shey Salcido     
Other psoriasis   

 

 ONYCHOMYCOSIS, TOENAILS  110.1    Resolved    Shey Salcido     Dermatophytosis of nail   

 

 MIGRAINE VARIANT  346.20    Active    Shey Salcido
     Variants of migraine, not elsewhere classified, without mention of 
intractable migraine, without mention of status migrainosus   

 

 ABNORMAL MAMMOGRAM  793.80  2012/10/15  Resolved    Shey Salcido     Abnormal mammogram, unspecified   

 

 BRONCHITIS  490    Resolved    Shey Salcido     
Bronchitis, not specified as acute or chronic   

 

 WHEEZING  786.07    Resolved    Shey Salcdio     
Wheezing   

 

 SINUSITIS, SPHENOIDAL, ACUTE  461.3    Resolved    Shey Salcido     Acute sphenoidal sinusitis   

 

 PHARYNGITIS, ACUTE  462    Resolved    Shey Salcido     Acute pharyngitis   

 

 CHEST PAIN, ATYPICAL  786.59    Resolved    Shey Salcido     Other chest pain   

 

 COUGH  786.2    Resolved    Shey Salcido     Cough
   

 

 ALLERGIC RHINITIS, SEASONAL  477.0    Active    Shey Salcido     Allergic rhinitis due to pollen   

 

 DYSPNEA  786.09    Resolved    Shey Salcido     
Other dyspnea and respiratory abnormality   

 

 ECZEMA  692.9    Active    Shey Salcido     
Contact dermatitis and other eczema, unspecified cause   







Medication List







 Medication  Instructions  Start Date  Stop Date  Generic Name  NDC  Status  
Provider  Patient Instruction

 

 MINOCIN 100 MG CAPS  1 PO BID    MINOCYCLINE HCL  
[id number]  No Longer Active  Shey Salcido   

 

 DIFLUCAN 100 MG TAB  1 PO daily for three days for yeast infection.    FLUCONAZOLE  [id number]  No Longer Active  Sheyjerod Salcido   

 

 PROAIR  (90 BASE) MCG/ACT AERS  2 puff Q4 hrs prn wheezing   
    ALBUTEROL SULFATE  [id number]  Active  Shey Salcido   

 

 PREDNISONE 10 MG TAB  2 PO at one time once daily for two days, then 1 PO 
daily for two days.    PREDNISONE  [id number]  No Longer 
Active  Sheyjerod Salcido   

 

 SUDAFED 30 MG TAB  1 PO TID prn congestion    
PSEUDOEPHEDRINE HCL  [id number]  No Longer Active  Shey Salcido   

 

 POTASSIUM CHLORIDE ER 20 MEQ CR-TABS  1 PO DAILY       POTASSIUM 
CHLORIDE  [id number]  Active  Shey Salcido   

 

 LAMISIL 250 MG TAB  1 po daily    TERBINAFINE HCL  
[id number]  No Longer Active  Sheyjerod Salcido   

 

 IMITREX 50 MG TABS  1 po at first sign of migrane    
SUMATRIPTAN SUCCINATE  [id number]  No Longer Active  Shey Salcido   

 

 NAFTIN 2 % CREA  apply small amount to affected areas bid    NAFTIFINE HCL  [id number]  No Longer Active  Sheyjerod Salcido   

 

 TRIAMCINOLONE ACETONIDE 0.5 % CREA  apply a small amount to affected area bid 
for 2 weeks    TRIAMCINOLONE ACETONIDE  [id number]  No 
Longer Active  Shey Salcido   

 

 NORVASC 2.5 MG TAB  1 PO QD       AMLODIPINE BESYLATE  [id number]  
Active  Shey Salcido   

 

 CEFTIN 500 MG TABS  1 PBID for 30 days    CEFUROXIME 
AXETIL  [id number]  No Longer Active  Ashley MASON'S NASAL SPRAY (DEXAMETHASONE, GENTAMICIN, SALINE)  2 puffs each 
nostril TID for 10 days    DR. CLARK NASAL SPRAY (
DEXAMETHASONE, GENTAMICIN, SALINE)     No Longer Active  Ashley Ana   

 

 DIFLUCAN 100 MG TAB  1 PO daily as directed    
FLUCONAZOLE  [id number]  No Longer Active  Ashley Ana   

 

 MAGIC MOUTHWASH  1 tsp PO QID    MAGIC MOUTHWASH     No 
Longer Active  Shey Natividad CLARK NASAL SPRAY (DEXAMETHASONE, GENTAMICIN, SALINE)  2 puffs each 
nostril TID for 10 days    2013/02/15  DR. CLARK NASAL SPRAY (
DEXAMETHASONE, GENTAMICIN, SALINE)     No Longer Active  Shey Natividad Salcido   

 

 HYDROCODONE-HOMATROPINE 5-1.5 MG/5ML SYRP  1 teaspoon PO Q6hrs prn    HYDROCODONE-HOMATROPINE  [id number]  No Longer Active  Shey 
Natividad Salcido   

 

 TESSALON 200 MG CAPS  1 PO TID prn cough    BENZONATATE  
[id number]  No Longer Active  Shey Natividad Salcido   

 

 LEVAQUIN 500 MG TAB  1 PO QD    LEVOFLOXACIN  
[id number]  No Longer Active  Shey Natividad Salcido   

 

 FIORINAL 325-50-40 MG CAP  1 PO Q4hrs prn headache       ASPIRIN-
CAFFEINE-BUTALBITAL  [id number]  Active  Shey Natividad Salcido   

 

 LAMISIL 250 MG TAB  1 PO daily    TERBINAFINE HCL  
[id number]  No Longer Active  Shey Natividad Salcido   

 

 NEEVO 29-1-0.4 MG TABS  1 PO daily       PRENAT VIT-FEPOLY-METHYLFOL-
FA  [id number]  No Longer Active  Shey Natividad Salcido   

 

 LUPRON DEPOT 11.25 MG KIT  1 injection q 3months       LEUPROLIDE 
ACETATE (3 MONTH)  [id number]  No Longer Active  Shey Natividad Salcido   

 

 XANAX 0.25 MG TABS  1/2 to 1 PO Q6hrs prn  2009/07/10    ALPRAZOLAM  
[id number]  No Longer Active  Shey Natividad Salcido   

 

 LORTAB 5 5-500 MG TABS  1 to 2 PO Q6hrs prn    
ACETAMINOPHEN-HYDROCODONE  [id number]  No Longer Active  Shey Natividad Angelaner 
  

 

 PRENATAL VITAMINS  TABS       PRENATAL MV & MIN W/FE-FA 
TABS  [id number]  No Longer Active  Shey Natividad Salcido   

 

 LAMISIL 250 MG TABS  1 po daily       TERBINAFINE HCL  [id number]  
No Longer Active  Shey Natividad Salcido   

 

 MAGNESIUM OXIDE 400 MG CAPS  1 PO daily    MAGNESIUM 
OXIDE  [id number]  No Longer Active  Shey Natividad Salcido   

 

 K-DUR 20 MEQ TAB CR  1 PO daily    POTASSIUM CHLORIDE   
  No Longer Active  Shey Natividad Salcido   

 

 DYAZIDE 37.5-25 MG CAP  1 PO daily       TRIAMTERENE-HCTZ  
[id number]  Active  Shey Natividad Angelaner   

 

 MAGNESIUM OXIDE 400 MG CAPS  1 po daily  2009/07/10     MAGNESIUM OXIDE  
[id number]  Active  Shey Natividad Salcido   

 

 DYAZIDE 37.5-25 MG CAP  1 PO daily     2009/07/10  TRIAMTERENE-HCTZ  
[id number]  No Longer Active  Shey Natividad Salcido   

 

 FEXOFENADINE  MG TABS  1 PO daily        FEXOFENADINE HCL  [id number]  
Active  Shey Natividad Omari   







Vital Signs







 Date  Name  Value  Unit  Range  Description

 

   blood pressure, diastolic - 8462-4  78  mm[Hg]     BP aceves

 

   blood pressure, systolic - 8480-6  122  mm[Hg]     BP sys

 

   pulse rate E&M - 8867-4  68  /min     Heart rate

 

   respiratory rate E&M - 9279-1  14  /min     Resp rate

 

   temperature E&M  98.6  [degF]     Body temperature

 

   weight E&M - 3141-9  168  [lb_av]     Weight Measured

 

   blood pressure, diastolic - 8462-4  86  mm[Hg]     BP aceves

 

   blood pressure, systolic - 8480-6  134  mm[Hg]     BP sys

 

   pulse rate E&M - 8867-4  68  /min     Heart rate

 

   respiratory rate E&M - 9279-1  14  /min     Resp rate

 

   temperature E&M  98.9  [degF]     Body temperature

 

   weight E&M - 3141-9  168  [lb_av]     Weight Measured

 

   blood pressure, diastolic - 8462-4  70  mm[Hg]     BP aceves

 

   blood pressure, systolic - 8480-6  140  mm[Hg]     BP sys

 

   pulse rate E&M - 8867-4  84  /min     Heart rate

 

   respiratory rate E&M - 9279-1  14  /min     Resp rate

 

   temperature E&M  98.6  [degF]     Body temperature

 

   weight E&M - 3141-9  170  [lb_av]     Weight Measured







Diagnostic Results







 Date  Name  Value  Unit  Range  Description

 

 Clinical Lists Update: CBC,CMP,FLP,TSH - Chemistry 

 

   chloride, serum  105  mmol/L      

 

   anion gap, serum  13         

 

   sodium, serum  140  mmol/L      

 

   alanine aminotransferase (SGPT), serum  9  U/L      

 

   aspartate aminotransferase (SGOT), serum  14  U/L      

 

   protein, total, serum  6.9  g/dL      

 

   potassium, serum  4.0  mmol/L      

 

   thyroid stimulating hormone, serum  1.55  u[iU]/mL      

 

   triglyceride, serum, fasting  76  mg/dL      

 

   calcium, serum  9.5  mg/dL      

 

   urea nitrogen, blood  15  mg/dL      

 

   bilirubin, serum, total  0.3  mg/dL      

 

   alkaline phosphatase, serum  58  U/L      

 

   albumin, serum  4.3  g/dL      

 

   LDL cholesterol, serum  93  mg/dL      

 

   HDL cholesterol, serum  48.0  mg/dL      

 

   glucose, plasma fasting  99  mg/dL      

 

   Estimated Glomerular Filtration Rate (calc)  96  mL/min/1.73m2    
  

 

   creatinine, serum  0.7  mg/dL      

 

   carbon dioxide, venous blood  26.0  mmol/L      

 

   cholesterol, serum  156  mg/dL      

 

   cholesterol/HDL ratio, serum, percent  3.3         

 

 Clinical Lists Update: CBC,CMP,FLP,TSH - Hematology 

 

   red blood cell distribution width  13.2  %      

 

   leukocyte count, blood  5.9  10*3/mm3      

 

   platelet count  251  10*3/mm3      

 

   erythrocyte (RBC) count  4.76  10*6/mm3      

 

   hemoglobin, blood  13.8  g/dL      

 

   hematocrit, blood  42  %      

 

   mean corpuscular volume, RBC  89  fL      

 

 Clinical Lists Update: CMP,FLP - Chemistry 

 

   albumin, serum  4.2  g/dL      

 

   LDL cholesterol, serum  98  mg/dL      

 

   HDL cholesterol, serum  51.0  mg/dL      

 

   triglyceride, serum, fasting  115  mg/dL      

 

   glucose, plasma fasting  98  mg/dL      

 

   Estimated Glomerular Filtration Rate (calc)  79  mL/min/1.73m2    
  

 

   creatinine, serum  0.8  mg/dL      

 

   carbon dioxide, venous blood  29.0  mmol/L      

 

   calcium, serum  9.5  mg/dL      

 

   cholesterol, serum  172  mg/dL      

 

   protein, total, serum  6.8  g/dL      

 

   cholesterol/HDL ratio, serum, percent  3.4         

 

   urea nitrogen, blood  15  mg/dL      

 

   aspartate aminotransferase (SGOT), serum  16  U/L      

 

   bilirubin, serum, total  0.4  mg/dL      

 

   potassium, serum  3.9  mmol/L      

 

   anion gap, serum  9         

 

   sodium, serum  136  mmol/L      

 

   chloride, serum  102  mmol/L      

 

   alanine aminotransferase (SGPT), serum  18  U/L      

 

   alkaline phosphatase, serum  75  U/L      







Encounters







 Code  Encounter  Date  Provider  Facility

 

 CPT-00551  Ofc Vst, Est Level IV   19:34:58 CDT  Sheyjerod AMANDA Omari, DO, FACP

 

 CPT-02150  Ofc Vst, Est Level III   15:19:53 CDT  Shey Salcido  [facility]

 

 CPT-03265  Ofc Vst, Est Level IV   17:17:47 CDT  Shey Natividad AMANDA Omari, DO, FACP

 

 CPT-52842  Ofc Vst, Est Level IV   12:58:28 CST  Shey AMANDA Omari, DO, FACP

 

 CPT-56002  Ofc Vst, Est Level III   15:21:05 CDT  Shey Natividad AMANDA Omari, DO, FACP

 

 CPT-21684  Ofc Vst, Est Level III   19:52:46 CST  Shey AMANDA Omari, DO, FACP

 

 CPT-60223  Ofc Vst, Est Level III   14:54:18 CST  Shey Salcido  [facility]

 

 CPT-04416  Ofc Vst, Est Level IV   16:08:18 CDT  Sheyjerod Salcido  [facility]

 

 CPT-14129  Ofc Vst, Est Level III   10:37:17 CDT  Shey AMANDA Omari, DO, FACP

 

 CPT-17795  Ofc Vst, Est Level IV   15:57:52 CDT  Shey Natividad AMANDA Omari, DO, FACP

 

 CPT-61463  Ofc Vst, Est Level IV   16:27:32 CDT  Sheyjerod Salcido  [facility]

 

 CPT-76535  Ofc Vst, Est Level IV  2009/07/10 10:30:06 CDT  Shey AMANDA Omari, DO, FACP

 

 CPT-91159  Ofc Vst, New Level IV   15:56:53 CDT  Shey Natividad Salcido  [facility]

## 2017-11-29 NOTE — XMS REPORT
Continuity of Care Document

 Created on: 2017



KATTY AGUIAR

External Reference #: [id number]

: 1965

Sex: Female



Demographics







 Address  [address] [place]  [address]

 

 Home Phone  [phone] x

 

 Preferred Language  Unknown

 

 Marital Status  Unknown

 

 Jewish Affiliation  Unknown

 

 Race  Unknown

 

 Ethnic Group  Unknown





Author







 Author  [facility]

 

 Organization  [facility]

 

 Address  Unknown

 

 Phone  Unavailable



                                                      



Allergies

                      





 Active                    Description                    Code                  
  Type                    Severity                    Reaction                  
  Onset                    Reported/Identified                    Relationship 
to Patient                    Clinical Status                

 

 Yes                    ENVIRONMENTAL                    ENVIRONMENTAL         
                                Mild                    N/A                    
                     2009                                                
          



                                                                               
         



Medications

                                                                               
         



Problems

                      





 Date Dx Coded                    Attending                    Type            
        Code                    Diagnosis                    Diagnosed By      
          

 

 2011                                         Ot                    
574.10                    CHOLELITH W CHOLECYS NEC                             
        

 

 2011                                         Ot                    575.6
                    GB CHOLESTEROLOSIS                                     

 

 2014                                         Ot                    621.2
                                                          

 

 2014                                         Ot                    625.8
                                                          

 

 2014                                         Ot                    787.3
                                                          

 

 2014                                         Ot                    620.2
                                                          

 

 2014                                         Ot                    621.2
                                                          

 

 2014                                         Ot                    620.2
                                                          

 

 2014                                         Ot                    793.5
                                                          

 

 2014                                         Ot                    621.0
                                                          

 

 2014                                         Ot                    626.8
                                                          

 

 2014                                         Ot                    793.5
                                                          

 

 2014                                         Ot                    
V72.83                                                          

 

 2014                                         Ot                    V74.8
                                                          

 

 2014                                         Ot                    
789.00                                                          

 

 2014                                         Ot                    
611.72                                                          

 

 2014                                         Ot                    
V76.12                                                          

 

 2014                                         Ot                    
V76.12                                                          

 

 2014                                         Ot                    575.6
                                                          

 

 2014                                         Ot                    
V72.63                                                          

 

 2014                                         Ot                    
V72.81                                                          

 

 2014                                         Ot                    V74.8
                                                          

 

 2014                                         Ot                    
793.82                                                          

 

 2014                                         Ot                    
V76.12                                                          

 

 2014                                         Ot                    
793.82                                                          

 

 2014                                         Ot                    
793.89                                                          

 

 2014                                         Ot                    
793.89                                                          

 

 2014                    ADRIANNA MUNOZ DO                    Ot         
           793.80                                                          

 

 2014                                         Ot                    621.2
                                                          

 

 2014                                         Ot                    625.8
                                                          

 

 2014                                         Ot                    787.3
                                                          

 

 2014                                         Ot                    620.2
                                                          

 

 2014                                         Ot                    621.2
                                                          

 

 2014                                         Ot                    620.2
                                                          

 

 2014                                         Ot                    793.5
                                                          

 

 2014                                         Ot                    621.0
                                                          

 

 2014                                         Ot                    626.8
                                                          

 

 2014                                         Ot                    793.5
                                                          

 

 2014                                         Ot                    
V72.83                                                          

 

 2014                                         Ot                    V74.8
                                                          

 

 2014                                         Ot                    
789.00                                                          

 

 2014                                         Ot                    
611.72                                                          

 

 2014                                         Ot                    
V76.12                                                          

 

 2014                                         Ot                    
V76.12                                                          

 

 2014                                         Ot                    575.6
                                                          

 

 2014                                         Ot                    
V72.63                                                          

 

 2014                                         Ot                    
V72.81                                                          

 

 2014                                         Ot                    V74.8
                                                          

 

 2014                                         Ot                    
793.82                                                          

 

 2014                                         Ot                    
V76.12                                                          

 

 2014                                         Ot                    
793.82                                                          

 

 2014                                         Ot                    
793.89                                                          

 

 2014                                         Ot                    
793.89                                                          

 

 2014                    ADRIANNA MUNOZ DO                    Ot         
           793.80                                                          

 

 12/10/2014                    ADRIANNA MUNOZ DO                    Ot         
           793.89                                                          

 

 2015                    ADRIANNA MUNOZ DO                    Ot         
           793.89                                                          

 

 2015                                         Ot                    
611.72                                                          

 

 2015                                         Ot                    
V76.12                                                          

 

 2015                                         Ot                    
V76.12                                                          

 

 2015                                         Ot                    575.6
                                                          

 

 2015                                         Ot                    
V72.63                                                          

 

 2015                                         Ot                    
V72.81                                                          

 

 2015                                         Ot                    V74.8
                                                          

 

 2015                                         Ot                    
793.82                                                          

 

 2015                                         Ot                    
V76.12                                                          

 

 2015                                         Ot                    
793.82                                                          

 

 2015                                         Ot                    
793.89                                                          

 

 2015                                         Ot                    
793.89                                                          

 

 2015                    ADRIANNA MUNOZ DO                    Ot         
           793.80                                                          

 

 2015                    ADRIANNA MUNOZ DO                    Ot         
           793.89                                                          

 

 11/10/2015                                         Ot                    
611.72                                                          

 

 11/10/2015                                         Ot                    
V76.12                                                          

 

 11/10/2015                                         Ot                    
V76.12                                                          

 

 11/10/2015                                         Ot                    575.6
                                                          

 

 11/10/2015                                         Ot                    
V72.63                                                          

 

 11/10/2015                                         Ot                    
V72.81                                                          

 

 11/10/2015                                         Ot                    V74.8
                                                          

 

 11/10/2015                                         Ot                    
793.82                                                          

 

 11/10/2015                                         Ot                    
V76.12                                                          

 

 11/10/2015                                         Ot                    
793.82                                                          

 

 11/10/2015                                         Ot                    
793.89                                                          

 

 11/10/2015                                         Ot                    
793.89                                                          

 

 11/10/2015                    ALEXANDER LEEADRIANNA JUS                    Ot         
           793.80                                                          

 

 11/10/2015                    MUNOZ DOADRIANNA JUS                    Ot         
           793.89                                                          

 

 2016                                         Ot                    
611.72                                                          

 

 2016                                         Ot                    
V76.12                                                          

 

 2016                                         Ot                    
V76.12                                                          

 

 2016                                         Ot                    575.6
                                                          

 

 2016                                         Ot                    
V72.63                                                          

 

 2016                                         Ot                    
V72.81                                                          

 

 2016                                         Ot                    V74.8
                                                          

 

 2016                                         Ot                    
793.82                                                          

 

 2016                                         Ot                    
V76.12                                                          

 

 2016                                         Ot                    
793.82                                                          

 

 2016                                         Ot                    
793.89                                                          

 

 2016                                         Ot                    
793.89                                                          

 

 2016                    ALEXANDER LEE ADRIANNA C                    Ot         
           793.80                                                          

 

 2016                    ALEXANDER DO ADRIANNA C                    Ot         
           793.89                                                          

 

 2016                    ALEXANDER DO ADRIANNA C                    Ot         
           Z12.31                                                          

 

 02/10/2016                    ALEXANDER DO ADRIANNA C                    Ot         
           Z12.31                                                          

 

 2016                                         Ot                    
V76.12                                                          

 

 2016                                         Ot                    575.6
                                                          

 

 2016                                         Ot                    
V72.63                                                          

 

 2016                                         Ot                    
V72.81                                                          

 

 2016                                         Ot                    V74.8
                                                          

 

 2016                                         Ot                    
793.82                                                          

 

 2016                                         Ot                    
V76.12                                                          

 

 2016                                         Ot                    
793.82                                                          

 

 2016                                         Ot                    
793.89                                                          

 

 2016                                         Ot                    
793.89                                                          

 

 2016                    ALEXANDER LEE ADRIANNA C                    Ot         
           793.80                                                          

 

 2016                    MUNOZ DO ADRIANNA C                    Ot         
           793.89                                                          

 

 2016                    MUNOZ DO ADRIANNA JUS                    Ot         
           Z12.31                                                          

 

 2016                                         Ot                    
V76.12                                                          

 

 2016                                         Ot                    575.6
                                                          

 

 2016                                         Ot                    
V72.63                                                          

 

 2016                                         Ot                    
V72.81                                                          

 

 2016                                         Ot                    V74.8
                                                          

 

 2016                                         Ot                    
793.82                                                          

 

 2016                                         Ot                    
V76.12                                                          

 

 2016                                         Ot                    
793.82                                                          

 

 2016                                         Ot                    
793.89                                                          

 

 2016                                         Ot                    
793.89                                                          

 

 2016                    MUNOZ DO ADRIANNA C                    Ot         
           793.80                                                          

 

 2016                    MUNOZ DOADRIANNA                    Ot         
           793.89                                                          

 

 2016                                         Ot                    
V76.12                    OTH SCREEN MAMMO-MALIGN NEOPLASM OF YOUSIF             
                        

 

 2016                                         Ot                    575.6
                    GB CHOLESTEROLOSIS                                     

 

 2016                                         Ot                    
V72.63                    PRE-PROCEDURAL LABORATORY EXAMINATION                
                     

 

 2016                                         Ot                    
V72.81                    EXAM-PRE-OPERATIVE CARDIOVASCULAR                    
                 

 

 2016                                         Ot                    V74.8
                    SCREEN-BACTERIAL DIS NEC                                   
  

 

 2016                                         Ot                    
793.82                    INCONCLUSIVE MAMMOGRAM                               
      

 

 2016                                         Ot                    
V76.12                    OTH SCREEN MAMMO-MALIGN NEOPLASM OF YOUSIF             
                        

 

 2016                                         Ot                    
793.82                    INCONCLUSIVE MAMMOGRAM                               
      

 

 2016                                         Ot                    
793.89                    OTH (ABN) FINDINGS ON RADIOLOGICAL EXAMI             
                        

 

 2016                                         Ot                    
793.89                    OTH (ABN) FINDINGS ON RADIOLOGICAL EXAMI             
                        

 

 2016                    ADRINANA MUNOZ DO                    Ot         
           793.80                    UNSPEC ABNORMAL MAMMOGRAM                 
                    

 

 2016                    ADRIANNA MUNOZ DO                    Ot         
           793.89                    OTH (ABN) FINDINGS ON RADIOLOGICAL EXAMI  
                                   

 

 2016                                         Ot                    
V76.12                    OTH SCREEN MAMMO-MALIGN NEOPLASM OF YOUSIF             
                        

 

 2016                                         Ot                    575.6
                    GB CHOLESTEROLOSIS                                     

 

 2016                                         Ot                    
V72.63                    PRE-PROCEDURAL LABORATORY EXAMINATION                
                     

 

 2016                                         Ot                    
V72.81                    EXAM-PRE-OPERATIVE CARDIOVASCULAR                    
                 

 

 2016                                         Ot                    V74.8
                    SCREEN-BACTERIAL DIS NEC                                   
  

 

 2016                                         Ot                    
793.82                    INCONCLUSIVE MAMMOGRAM                               
      

 

 2016                                         Ot                    
V76.12                    OTH SCREEN MAMMO-MALIGN NEOPLASM OF YOUSIF             
                        

 

 2016                                         Ot                    
793.82                    INCONCLUSIVE MAMMOGRAM                               
      

 

 2016                                         Ot                    
793.89                    OTH (ABN) FINDINGS ON RADIOLOGICAL EXAMI             
                        

 

 2016                                         Ot                    
793.89                    OTH (ABN) FINDINGS ON RADIOLOGICAL EXAMI             
                        

 

 2016                    MUNOZ ADRIANNA                    Ot         
           793.80                    UNSPEC ABNORMAL MAMMOGRAM                 
                    

 

 2016                    ADRIANNA MUNOZ DO                    Ot         
           793.89                    OTH (ABN) FINDINGS ON RADIOLOGICAL EXAMI  
                                   

 

 2016                                         Ot                    
V76.12                    OTH SCREEN MAMMO-MALIGN NEOPLASM OF YOUSIF             
                        

 

 2016                                         Ot                    575.6
                    GB CHOLESTEROLOSIS                                     

 

 2016                                         Ot                    
V72.63                    PRE-PROCEDURAL LABORATORY EXAMINATION                
                     

 

 2016                                         Ot                    
V72.81                    EXAM-PRE-OPERATIVE CARDIOVASCULAR                    
                 

 

 2016                                         Ot                    V74.8
                    SCREEN-BACTERIAL DIS NEC                                   
  

 

 2016                                         Ot                    
793.82                    INCONCLUSIVE MAMMOGRAM                               
      

 

 2016                                         Ot                    
V76.12                    OTH SCREEN MAMMO-MALIGN NEOPLASM OF YOUSIF             
                        

 

 2016                                         Ot                    
793.82                    INCONCLUSIVE MAMMOGRAM                               
      

 

 2016                                         Ot                    
793.89                    OTH (ABN) FINDINGS ON RADIOLOGICAL EXAMI             
                        

 

 2016                                         Ot                    
793.89                    OTH (ABN) FINDINGS ON RADIOLOGICAL EXAMI             
                        

 

 2016                    ADRIANNA MUNOZ DO                    Ot         
           793.80                    UNSPEC ABNORMAL MAMMOGRAM                 
                    

 

 2016                    ADRIANNA MUNOZ DO                    Ot         
           793.89                    OTH (ABN) FINDINGS ON RADIOLOGICAL EXAMI  
                                   

 

 2016                                         Ot                    
V76.12                    OTH SCREEN MAMMO-MALIGN NEOPLASM OF YOUSIF             
                        

 

 2016                                         Ot                    575.6
                    GB CHOLESTEROLOSIS                                     

 

 2016                                         Ot                    
V72.63                    PRE-PROCEDURAL LABORATORY EXAMINATION                
                     

 

 2016                                         Ot                    
V72.81                    EXAM-PRE-OPERATIVE CARDIOVASCULAR                    
                 

 

 2016                                         Ot                    V74.8
                    SCREEN-BACTERIAL DIS NEC                                   
  

 

 2016                                         Ot                    
793.82                    INCONCLUSIVE MAMMOGRAM                               
      

 

 2016                                         Ot                    
V76.12                    OTH SCREEN MAMMO-MALIGN NEOPLASM OF YOUSIF             
                        

 

 2016                                         Ot                    
793.82                    INCONCLUSIVE MAMMOGRAM                               
      

 

 2016                                         Ot                    
793.89                    OTH (ABN) FINDINGS ON RADIOLOGICAL EXAMI             
                        

 

 2016                                         Ot                    
793.89                    OTH (ABN) FINDINGS ON RADIOLOGICAL EXAMI             
                        

 

 2016                    ADRIANNA MUNOZ DO                    Ot         
           793.80                    UNSPEC ABNORMAL MAMMOGRAM                 
                    

 

 2016                    ADRIANNA MUNOZ DO                    Ot         
           793.89                    OTH (ABN) FINDINGS ON RADIOLOGICAL EXAMI  
                                   

 

 06/15/2016                                         Ot                    
V76.12                    OTH SCREEN MAMMO-MALIGN NEOPLASM OF YOUSIF             
                        

 

 06/15/2016                                         Ot                    575.6
                    GB CHOLESTEROLOSIS                                     

 

 06/15/2016                                         Ot                    
V72.63                    PRE-PROCEDURAL LABORATORY EXAMINATION                
                     

 

 06/15/2016                                         Ot                    
V72.81                    EXAM-PRE-OPERATIVE CARDIOVASCULAR                    
                 

 

 06/15/2016                                         Ot                    V74.8
                    SCREEN-BACTERIAL DIS NEC                                   
  

 

 06/15/2016                                         Ot                    
793.82                    INCONCLUSIVE MAMMOGRAM                               
      

 

 06/15/2016                                         Ot                    
V76.12                    OTH SCREEN MAMMO-MALIGN NEOPLASM OF YOUSIF             
                        

 

 06/15/2016                                         Ot                    
793.82                    INCONCLUSIVE MAMMOGRAM                               
      

 

 06/15/2016                                         Ot                    
793.89                    OTH (ABN) FINDINGS ON RADIOLOGICAL EXAMI             
                        

 

 06/15/2016                                         Ot                    
793.89                    OTH (ABN) FINDINGS ON RADIOLOGICAL EXAMI             
                        

 

 06/15/2016                    ADRIANNA MUNOZ DO                    Ot         
           793.80                    UNSPEC ABNORMAL MAMMOGRAM                 
                    

 

 06/15/2016                    ADRIANNA MUNOZ DO                    Ot         
           793.89                    OTH (ABN) FINDINGS ON RADIOLOGICAL EXAMI  
                                   

 

 2017                                         Ot                    
793.82                    INCONCLUSIVE MAMMOGRAM                               
      

 

 2017                                         Ot                    
V76.12                    OTH SCREEN MAMMO-MALIGN NEOPLASM OF YOUSIF             
                        

 

 2017                                         Ot                    
793.82                    INCONCLUSIVE MAMMOGRAM                               
      

 

 2017                                         Ot                    
793.89                    OTH (ABN) FINDINGS ON RADIOLOGICAL EXAMI             
                        

 

 2017                                         Ot                    
793.89                    OTH (ABN) FINDINGS ON RADIOLOGICAL EXAMI             
                        

 

 2017                    ADRIANNA MUNOZ DO JUS                    Ot         
           793.80                    UNSPEC ABNORMAL MAMMOGRAM                 
                    

 

 2017                    ADRIANNA MUNOZ DO JUS                    Ot         
           793.89                    OTH (ABN) FINDINGS ON RADIOLOGICAL EXAMI  
                                   

 

 2017                    MUNOZJORDAN LEE ADRIANNA C                    Ot         
           Z12.31                    ENCNTR SCREEN MAMMOGRAM FOR MALIGNANT NE  
                                   

 

 2017                    MUNOZJORDAN LEE ADRIANNA C                    Ot         
           Z12.31                    ENCNTR SCREEN MAMMOGRAM FOR MALIGNANT NE  
                                   



                                                                               
                                                                               
                                                                               
                                                                               
                                                                               
                                                                               
                                                                               
                                                                               
                                                                               
                                                                               
                                                                               
                                                                               
                                                                               
                                                                               
                                                                               
                                                                               
                                                                               
                                                                               
                                                                               
                                                                               
                                                                               
                                                                               
                                                                               
                                                                               
                                                                               
                                           



Procedures

                                                                               
         



Results

                                                                    



Encounters

                      





 ACCT No.                    Visit Date/Time                    Discharge      
              Status                    Pt. Type                    Provider   
                 Facility                    Loc./Unit                    
Complaint                

 

 [id number]                    2017 06:46:00                    2017 23:59:59                    CLS                    Outpatient             
       ELIAS SCHULER DO                    [facility]    
                CARD                    OTHER CHEST PAIN R07.89                

 

 [id number]                    2017 07:24:00                    2017 23:59:59                    CLS                    Outpatient             
       ADRIANNA MUNOZ DO                    [facility]   
                 RAD                    SCREENING                

 

 [id number]                    2016 08:18:00                    2016 23:59:59                    CLS                    Outpatient             
       ADRIANNA MUNOZ DO                    [facility]   
                 RAD                                     

 

 [id number]                    2014 07:39:00                    2014 23:59:59                    CLS                    Outpatient             
       ADRIANNA MUNOZ DO                    [facility]   
                 RAD                    BREAST NODULE                

 

 [id number]                    10/29/2013 07:32:00                    10/29/
2013 23:59:59                    CLS                    Outpatient             
       ADRIANNA MUNOZ DO                    [facility]   
                 RAD                    ABN MAMMO                

 

 [id number]                    2017 13:00:00                           
              PEN                    Preadmit                    MELANIE NIETO, 
CHRISTIAN BELTRE                    [facility]                    
CATH                    CP,ABN STRESS,HTN                

 

 [id number]                    2015 12:04:00                           
                                   Document Registration                       
                                                                             

 

 [id number]                    2013 12:06:00                           
                                   Document Registration                       
                                                                             

 

 [id number]                    10/15/2012 07:35:00                           
                                   Document Registration                       
                                                                             

 

 [id number]                    10/10/2012 07:08:00                           
                                   Document Registration                       
                                                                             

 

 [id number]                    2011 05:35:00                           
                                   Document Registration                       
                                                                             

 

 [id number]                    2011 14:04:00                           
                                   Document Registration                       
                                                                             

 

 [id number]                    2011 08:27:00                           
                                   Document Registration                       
                                                                             

 

 [id number]                    2010 09:30:00                           
                                   Document Registration                       
                                                                             

 

 [id number]                    2010 08:14:00                           
                                   Document Registration                       
                                                                             

 

 [id number]                    10/23/2009 13:00:00                           
                                   Document Registration                       
                                                                             

 

 [id number]                    2009 11:06:00                           
                                   Document Registration                       
                                                                             

 

 [id number]                    2009 10:05:00                           
                                   Document Registration                       
                                                                             

 

 [id number]                    2009 10:01:00                           
                                   Document Registration

## 2018-05-25 ENCOUNTER — HOSPITAL ENCOUNTER (OUTPATIENT)
Dept: HOSPITAL 75 - RAD | Age: 53
End: 2018-05-25
Attending: OBSTETRICS & GYNECOLOGY
Payer: COMMERCIAL

## 2018-05-25 DIAGNOSIS — R92.1: ICD-10-CM

## 2018-05-25 DIAGNOSIS — Z12.31: Primary | ICD-10-CM

## 2018-05-25 PROCEDURE — 77067 SCR MAMMO BI INCL CAD: CPT

## 2018-05-25 NOTE — DIAGNOSTIC IMAGING REPORT
INDICATION: Routine screening.



Comparison is made with prior exam from 2/28/2017 and 1/12/2016.



2-D and 3-D bilateral screening mammography was performed with

CAD.



The current study was also evaluated with a Computer Aided

Detection (CAD) system.



FINDINGS: Both breasts remain heterogeneously dense, limiting the

sensitivity of mammography. No mass is seen. There are some

emerging calcifications in the far posterior left breast, only

seen on the MLO view. Additional views are recommended. No other

suspicious abnormalities are seen. Axillae are unremarkable.



IMPRESSION: Left breast calcifications. Additional views are

recommended for further evaluation.



ACR BI-RADS Category 0: Incomplete. (Needs additional imaging

evaluation).

Result letter will be mailed to the patient.

Note: At least 10% of breast cancer is not imaged by mammography.



Dictated by: 



  Dictated on workstation # [id number]

## 2018-06-18 ENCOUNTER — HOSPITAL ENCOUNTER (OUTPATIENT)
Dept: HOSPITAL 75 - RAD | Age: 53
End: 2018-06-18
Attending: OBSTETRICS & GYNECOLOGY
Payer: COMMERCIAL

## 2018-06-18 DIAGNOSIS — R92.1: Primary | ICD-10-CM

## 2018-06-18 NOTE — DIAGNOSTIC IMAGING REPORT
Indication: Left breast calcifications. Patient presents for

additional views.



Comparison is made with recent screening study from 05/25/2018.



Unilateral left 2-D and 3-D diagnostic mammography was performed

including exaggerated CC views as well as magnification ML and CC

and conventional 90 degree lateral views.



There is a cluster of microcalcifications in the far posterior

left breast, slightly outer and slightly inferior. These are

considered indeterminate. No associated soft tissue mass is seen.

No other abnormalities are seen.



Impression: BI-RADS 4



Indeterminate suspicious cluster of microcalcifications in the

far posterior left breast slightly outer aspect. These would be

very difficult to sample stereotactically due to the extreme

posterior location of the calcifications. Consideration should be

given to performance of a wire localization procedure. Surgical

consult is recommended.



ACR BI-RADS Category 4: Suspicious abnormality.

Result letter will be mailed to the patient.

Note: At least 10% of breast cancer is not imaged by mammography.



Dictated by: 



  Dictated on workstation # [id number]

## 2019-07-29 ENCOUNTER — HOSPITAL ENCOUNTER (OUTPATIENT)
Dept: HOSPITAL 75 - RAD | Age: 54
End: 2019-07-29
Attending: OBSTETRICS & GYNECOLOGY
Payer: COMMERCIAL

## 2019-07-29 DIAGNOSIS — Z98.890: ICD-10-CM

## 2019-07-29 DIAGNOSIS — Z12.31: Primary | ICD-10-CM

## 2019-07-29 PROCEDURE — 77067 SCR MAMMO BI INCL CAD: CPT

## 2019-07-29 NOTE — DIAGNOSTIC IMAGING REPORT
INDICATION: Routine screening.



COMPARISON: Comparison is made with prior mammograms from

05/25/2018 and 02/28/2017.



TECHNIQUE: 2-D and 3-D bilateral screening mammography was

performed. The current study was also evaluated with a Computer

Aided Detection (CAD) system. 3-D tomosynthesis was also

performed and reviewed.



FINDINGS: Both breasts remain heterogeneously dense, limiting the

sensitivity of mammography. Postsurgical changes in the left

breast with multiple surgical clips are noted. Previously noted

microcalcifications in the far posterior left breast have been

surgically removed. No new mass or malignant appearing

microcalcifications are seen. The axillae are unremarkable.



IMPRESSION: No mammographic features suspicious for malignancy

are identified.



ACR BI-RADS Category 2: Benign findings.

Result letter will be mailed to the patient.

Note: At least 10% of breast cancer is not imaged by mammography.



Dictated by: 



  Dictated on workstation # [id number]

## 2021-02-16 ENCOUNTER — HOSPITAL ENCOUNTER (OUTPATIENT)
Dept: HOSPITAL 75 - RAD | Age: 56
End: 2021-02-16
Attending: INTERNAL MEDICINE
Payer: COMMERCIAL

## 2021-02-16 DIAGNOSIS — Z12.31: Primary | ICD-10-CM

## 2021-02-16 PROCEDURE — 77067 SCR MAMMO BI INCL CAD: CPT

## 2021-02-16 PROCEDURE — 77063 BREAST TOMOSYNTHESIS BI: CPT

## 2021-02-16 NOTE — DIAGNOSTIC IMAGING REPORT
EXAMINATION:

Digital mammogram bilateral screening with CAD.



INDICATION: 

Screening.



COMPARISON: 

This study was compared to the prior exams of 07/29/2019,

05/25/2018, and 02/28/2017.



PERSONAL HISTORY: 

At this time, there are no current complaints.



FINDINGS:

The fibroglandular tissue in both breasts is dense. This does

limit the sensitivity of this exam. When compared to the previous

study, there does not appear to have been any significant change.

There is no primary or secondary sign of malignancy noted.

Surgical clips are again seen in the left breast.



IMPRESSION: 

1. There is no evidence of malignancy. 

2. The patient should have her annual bilateral screening

mammogram on schedule in February 2022.



ACR BI-RADS Category 1: Negative.

Result letter will be mailed to the patient.

Note:  At least 10% of breast cancer is not imaged by

mammography.



Dictated by: 



  Dictated on workstation # [id number]

## 2022-02-25 ENCOUNTER — HOSPITAL ENCOUNTER (OUTPATIENT)
Dept: HOSPITAL 75 - RAD | Age: 57
End: 2022-02-25
Attending: INTERNAL MEDICINE
Payer: COMMERCIAL

## 2022-02-25 DIAGNOSIS — Z12.31: Primary | ICD-10-CM

## 2022-02-25 PROCEDURE — 77063 BREAST TOMOSYNTHESIS BI: CPT

## 2022-02-25 PROCEDURE — 77067 SCR MAMMO BI INCL CAD: CPT

## 2022-02-25 NOTE — DIAGNOSTIC IMAGING REPORT
INDICATION: 

Routine screening.



COMPARISON:      

02/16/2021 and 07/29/2019.



TECHNIQUE: 

2D and 3D bilateral screening mammography was performed with CAD.



FINDINGS:

Both breasts are heterogeneously dense, limiting the sensitivity

of mammography. Surgical clips in the lower posterior left breast

appear stable. There are benign calcifications present. No mass

or malignant-appearing microcalcifications are seen. Axillae are

unremarkable.



IMPRESSION: 

No mammographic features suspicious for malignancy are

identified.



ACR BI-RADS Category 2: Benign findings.

Result letter will be mailed to the patient.

Note: At least 10% of breast cancer is not imaged by mammography.



Dictated by: 



  Dictated on workstation # [id number]

## 2023-03-31 ENCOUNTER — HOSPITAL ENCOUNTER (OUTPATIENT)
Dept: HOSPITAL 75 - RAD | Age: 58
End: 2023-03-31
Attending: INTERNAL MEDICINE
Payer: COMMERCIAL

## 2023-03-31 DIAGNOSIS — Z12.31: Primary | ICD-10-CM

## 2023-03-31 PROCEDURE — 77063 BREAST TOMOSYNTHESIS BI: CPT

## 2023-03-31 PROCEDURE — 77067 SCR MAMMO BI INCL CAD: CPT

## 2023-04-03 NOTE — DIAGNOSTIC IMAGING REPORT
3-D bilateral screening mammogram with CAD.



This study was compared to the prior exams of 02/25/2022,

02/16/2021 and 07/29/2019.



The current study was also evaluated with a Computer Aided

Detection (CAD) system.



FINDINGS: The fibroglandular tissue in both breasts is

heterogeneously dense. This does limit the sensitivity of this

exam. Overall, there does not appear to have been any significant

change when compared to the prior study. No primary or secondary

sign of malignancy is noted. The surgical clips deep in the left

breast seen presumably are again evident and no different.



IMPRESSION: There is no radiographic evidence for malignancy. 



ACR BI-RADS Category 1: Negative.

Result letter will be mailed to the patient.

Note:  At least 10% of breast cancer is not imaged by

mammography.



Dictated by: 



  Dictated on workstation # [id number]